# Patient Record
Sex: FEMALE | Race: WHITE | Employment: OTHER | URBAN - METROPOLITAN AREA
[De-identification: names, ages, dates, MRNs, and addresses within clinical notes are randomized per-mention and may not be internally consistent; named-entity substitution may affect disease eponyms.]

---

## 2017-06-02 ENCOUNTER — HOSPITAL ENCOUNTER (EMERGENCY)
Age: 77
Discharge: HOME | End: 2017-06-02
Attending: EMERGENCY MEDICINE
Payer: MEDICARE

## 2017-06-02 VITALS
HEIGHT: 67 IN | WEIGHT: 196 LBS | OXYGEN SATURATION: 95 % | BODY MASS INDEX: 30.76 KG/M2 | TEMPERATURE: 98.6 F | SYSTOLIC BLOOD PRESSURE: 117 MMHG | DIASTOLIC BLOOD PRESSURE: 93 MMHG | HEART RATE: 107 BPM | RESPIRATION RATE: 18 BRPM

## 2017-06-02 DIAGNOSIS — M10.9 ACUTE GOUT OF LEFT ANKLE, UNSPECIFIED CAUSE: Primary | ICD-10-CM

## 2017-06-02 DIAGNOSIS — R32 URINARY INCONTINENCE, UNSPECIFIED TYPE: ICD-10-CM

## 2017-06-02 LAB
ALBUMIN SERPL BCP-MCNC: 3.6 G/DL (ref 3.2–4.6)
ALBUMIN/GLOB SERPL: 0.7 {RATIO} (ref 1.2–3.5)
ALP SERPL-CCNC: 67 U/L (ref 50–136)
ALT SERPL-CCNC: 30 U/L (ref 12–65)
ANION GAP BLD CALC-SCNC: 7 MMOL/L (ref 7–16)
AST SERPL W P-5'-P-CCNC: 97 U/L (ref 15–37)
BACTERIA URNS QL MICRO: ABNORMAL /HPF
BASOPHILS # BLD AUTO: 0 K/UL (ref 0–0.2)
BASOPHILS # BLD: 0 % (ref 0–2)
BILIRUB DIRECT SERPL-MCNC: <0.1 MG/DL
BILIRUB SERPL-MCNC: 0.8 MG/DL (ref 0.2–1.1)
BUN SERPL-MCNC: 29 MG/DL (ref 8–23)
CALCIUM SERPL-MCNC: 9.8 MG/DL (ref 8.3–10.4)
CASTS URNS QL MICRO: 0 /LPF
CHLORIDE SERPL-SCNC: 103 MMOL/L (ref 98–107)
CO2 SERPL-SCNC: 24 MMOL/L (ref 21–32)
CREAT SERPL-MCNC: 1.68 MG/DL (ref 0.6–1)
CRYSTALS URNS QL MICRO: 0 /LPF
DIFFERENTIAL METHOD BLD: ABNORMAL
EOSINOPHIL # BLD: 0.1 K/UL (ref 0–0.8)
EOSINOPHIL NFR BLD: 0 % (ref 0.5–7.8)
EPI CELLS #/AREA URNS HPF: ABNORMAL /HPF
ERYTHROCYTE [DISTWIDTH] IN BLOOD BY AUTOMATED COUNT: 13.2 % (ref 11.9–14.6)
GLOBULIN SER CALC-MCNC: 5 G/DL (ref 2.3–3.5)
GLUCOSE SERPL-MCNC: 129 MG/DL (ref 65–100)
HCT VFR BLD AUTO: 39.3 % (ref 35.8–46.3)
HGB BLD-MCNC: 13.5 G/DL (ref 11.7–15.4)
IMM GRANULOCYTES # BLD: 0 K/UL (ref 0–0.5)
IMM GRANULOCYTES NFR BLD AUTO: 0.3 % (ref 0–5)
LACTATE BLD-SCNC: 1.3 MMOL/L (ref 0.5–1.9)
LYMPHOCYTES # BLD AUTO: 14 % (ref 13–44)
LYMPHOCYTES # BLD: 1.8 K/UL (ref 0.5–4.6)
MCH RBC QN AUTO: 31.2 PG (ref 26.1–32.9)
MCHC RBC AUTO-ENTMCNC: 34.4 G/DL (ref 31.4–35)
MCV RBC AUTO: 90.8 FL (ref 79.6–97.8)
MONOCYTES # BLD: 1.2 K/UL (ref 0.1–1.3)
MONOCYTES NFR BLD AUTO: 10 % (ref 4–12)
MUCOUS THREADS URNS QL MICRO: 0 /LPF
NEUTS SEG # BLD: 9.7 K/UL (ref 1.7–8.2)
NEUTS SEG NFR BLD AUTO: 76 % (ref 43–78)
OTHER OBSERVATIONS,UCOM: ABNORMAL
PLATELET # BLD AUTO: 218 K/UL (ref 150–450)
PMV BLD AUTO: 12.2 FL (ref 10.8–14.1)
POTASSIUM SERPL-SCNC: 3.6 MMOL/L (ref 3.5–5.1)
POTASSIUM SERPL-SCNC: 6 MMOL/L (ref 3.5–5.1)
PROT SERPL-MCNC: 8.6 G/DL (ref 6.3–8.2)
RBC # BLD AUTO: 4.33 M/UL (ref 4.05–5.25)
RBC #/AREA URNS HPF: ABNORMAL /HPF
SODIUM SERPL-SCNC: 134 MMOL/L (ref 136–145)
URATE SERPL-MCNC: 9.6 MG/DL (ref 2.6–6)
WBC # BLD AUTO: 12.8 K/UL (ref 4.3–11.1)
WBC URNS QL MICRO: ABNORMAL /HPF

## 2017-06-02 PROCEDURE — A9270 NON-COVERED ITEM OR SERVICE: HCPCS | Performed by: EMERGENCY MEDICINE

## 2017-06-02 PROCEDURE — 84132 ASSAY OF SERUM POTASSIUM: CPT | Performed by: EMERGENCY MEDICINE

## 2017-06-02 PROCEDURE — 81015 MICROSCOPIC EXAM OF URINE: CPT | Performed by: EMERGENCY MEDICINE

## 2017-06-02 PROCEDURE — 85025 COMPLETE CBC W/AUTO DIFF WBC: CPT | Performed by: EMERGENCY MEDICINE

## 2017-06-02 PROCEDURE — 99285 EMERGENCY DEPT VISIT HI MDM: CPT | Performed by: EMERGENCY MEDICINE

## 2017-06-02 PROCEDURE — 96361 HYDRATE IV INFUSION ADD-ON: CPT | Performed by: EMERGENCY MEDICINE

## 2017-06-02 PROCEDURE — 80048 BASIC METABOLIC PNL TOTAL CA: CPT | Performed by: EMERGENCY MEDICINE

## 2017-06-02 PROCEDURE — 74011250636 HC RX REV CODE- 250/636: Performed by: EMERGENCY MEDICINE

## 2017-06-02 PROCEDURE — 74011636637 HC RX REV CODE- 636/637: Performed by: EMERGENCY MEDICINE

## 2017-06-02 PROCEDURE — 80076 HEPATIC FUNCTION PANEL: CPT | Performed by: EMERGENCY MEDICINE

## 2017-06-02 PROCEDURE — 81003 URINALYSIS AUTO W/O SCOPE: CPT | Performed by: EMERGENCY MEDICINE

## 2017-06-02 PROCEDURE — 84550 ASSAY OF BLOOD/URIC ACID: CPT | Performed by: EMERGENCY MEDICINE

## 2017-06-02 PROCEDURE — 96360 HYDRATION IV INFUSION INIT: CPT | Performed by: EMERGENCY MEDICINE

## 2017-06-02 PROCEDURE — 83605 ASSAY OF LACTIC ACID: CPT

## 2017-06-02 RX ORDER — PREDNISONE 20 MG/1
40 TABLET ORAL DAILY
Qty: 10 TAB | Refills: 0 | Status: SHIPPED | OUTPATIENT
Start: 2017-06-02 | End: 2017-06-07

## 2017-06-02 RX ORDER — PREDNISONE 20 MG/1
40 TABLET ORAL
Status: COMPLETED | OUTPATIENT
Start: 2017-06-02 | End: 2017-06-02

## 2017-06-02 RX ORDER — TRAMADOL HYDROCHLORIDE 50 MG/1
50 TABLET ORAL
Qty: 12 TAB | Refills: 0 | Status: SHIPPED | OUTPATIENT
Start: 2017-06-02 | End: 2017-06-13

## 2017-06-02 RX ADMIN — SODIUM CHLORIDE 500 ML: 900 INJECTION, SOLUTION INTRAVENOUS at 01:53

## 2017-06-02 RX ADMIN — PREDNISONE 40 MG: 20 TABLET ORAL at 03:13

## 2017-06-02 RX ADMIN — SODIUM CHLORIDE 500 ML: 900 INJECTION, SOLUTION INTRAVENOUS at 00:38

## 2017-06-02 NOTE — DISCHARGE INSTRUCTIONS

## 2017-06-02 NOTE — ED PROVIDER NOTES
HPI Comments: 30-year-old white female with recurrent urinary tract infections as well as urinary incontinence and urinary discomfort and swelling in her feet for the past week. She is on a maintenance antibiotic but reportedly still gets infections which require antibiotic changes. She's had no fever or vomiting. No back pain. She does report pain in her feet from the swelling and is having some difficulty walking because of this. Patient is a 77 y.o. female presenting with foot pain. The history is provided by the patient and a relative. Foot Pain    Pertinent negatives include no back pain and no neck pain.         Past Medical History:   Diagnosis Date    Acute on Chronic UTI 9/10/2010    Allergic rhinitis     AMD (age-related macular degeneration), bilateral     ARF (acute renal failure) ([facility] [address] 12/13/2010    Arrhythmia     hx tachycardia     Ataxia     C. difficile colitis 12/17/2010    Calculus of kidney 2/20/2014    Cancer ([facility] [address]     neck skin ca 2 times    Chest pain     Cystocele, midline 2/20/2014    Depression     Falls frequently      last fall 10/11/13    GERD (gastroesophageal reflux disease)     takes OTC    Gross hematuria 2/20/2014    Headache     Hematuria, microscopic     Hypertension     controlled with meds     Hypomagnesemia 12/13/2010    Hypothyroidism     Incomplete bladder emptying 2/20/2014    Incontinence 4/10/2014    Kidney stones     Menopausal syndrome     Microscopic hematuria 2/20/2014    Mixed incontinence urge and stress (male)(female) 2/20/2014    Neurogenic bladder, NOS 2/20/2014    Neuropathy      lower legs    Obese     Osteoarthritis of left knee 3/29/2013    Other chronic cystitis 2/20/2014    Other musculoskeletal symptoms referable to limbs 2/20/2014    Other nonspecific finding on examination of urine 2/20/2014    Pneumonia RLL infiltrate 12/17/2010    Polyneuropathy     Postmenopausal atrophic vaginitis 2/20/2014    Sepsis 9/10/2010    Splitting of urinary stream 2/20/2014    Stroke [facility])     [facility] - Dr. Clara Eckert Total knee replacement status 3/29/2013    Unspecified disorder of bladder 2/20/2014    Unspecified pyelonephritis 9/10/2010    Unspecified urinary incontinence 2/20/2014    S/p Anterior Elevate repair, trans obturator sling    Urge incontinence 2/20/2014    UTI (urinary tract infection) Enterobacter 12/17/2010       Past Surgical History:   Procedure Laterality Date    HX BACK SURGERY      HX COLONOSCOPY      HX HEART CATHETERIZATION  > 5 years ago    2 heart caths - no stents    HX HEENT      cyst removed from nose and gums    HX HYSTERECTOMY      with cyst on ovaries removed    HX KNEE ARTHROSCOPY      Left    HX MOHS PROCEDURES      Right X 2    HX ORTHOPAEDIC      left knee replacement    HX OTHER SURGICAL      rectocele repair    HX OTHER SURGICAL      Elevated cystocele repair    HX OTHER SURGICAL      neck surgury    HX OTHER SURGICAL      [place] Trans Obturator vaginal sling urethropexy ([facility])    HX OTHER SURGICAL      HNP    HX UROLOGICAL      3 bladder tacks    HX UROLOGICAL      2 benign tumors removed from bladder    HX UROLOGICAL      cystoscopy         Family History:   Problem Relation Age of Onset    Heart Disease Father     Stroke Father     Heart Failure Father     Hypertension Father     Cancer Sister     Diabetes Brother     Cancer Sister     Thyroid Disease Sister     Diabetes Brother     Cancer Brother     Hypertension Other      brother, sister    Thyroid Disease Other      brother, sister    Breast Cancer Neg Hx        Social History     Social History    Marital status:      Spouse name: N/A    Number of children: N/A    Years of education: N/A     Occupational History    Not on file.      Social History Main Topics    Smoking status: Never Smoker    Smokeless tobacco: Never Used    Alcohol use No    Drug use: No    Sexual activity: Not on file Other Topics Concern    Not on file     Social History Narrative         ALLERGIES: Adhesive tape-silicones; Clarithromycin; Lortab [hydrocodone-acetaminophen]; Other medication; Peanut; and Prevacid [lansoprazole]    Review of Systems   Constitutional: Negative for fever. HENT: Negative for congestion. Respiratory: Negative for cough and shortness of breath. Cardiovascular: Positive for leg swelling. Negative for chest pain. Gastrointestinal: Negative for abdominal pain, nausea and vomiting. Genitourinary: Positive for dysuria and frequency. Musculoskeletal: Negative for back pain and neck pain. Skin: Negative for rash. Neurological: Negative for headaches. Vitals:    06/02/17 0008   BP: 145/75   Pulse: (!) 107   Resp: 18   Temp: 98.6 °F (37 °C)   SpO2: 95%   Weight: 88.9 kg (196 lb)   Height: 5' 7\" (1.702 m)            Physical Exam   Constitutional: She appears well-developed and well-nourished. No distress. HENT:   Head: Normocephalic and atraumatic. Mouth/Throat: Oropharynx is clear and moist.   Eyes: Conjunctivae are normal. Pupils are equal, round, and reactive to light. Neck: Normal range of motion. Neck supple. Cardiovascular: Normal rate and regular rhythm. No murmur heard. Pulmonary/Chest: Effort normal and breath sounds normal.   Abdominal: Soft. She exhibits no distension. There is no tenderness. Musculoskeletal:   Trace edema in both feet left slightly greater than right. No calf pain. No pretibial edema. Neurological: She is alert. Skin: Skin is warm and dry. Psychiatric: She has a normal mood and affect. Nursing note and vitals reviewed. MDM  Number of Diagnoses or Management Options  Acute gout of left ankle, unspecified cause:   Urinary incontinence, unspecified type:   Diagnosis management comments: CBC is unremarkable except for mild leukocyte esterase is 12.8. Lactic acid is normal at 1.3.  Patient panel shows mild dehydration with a creatinine of 1.68 BUN 29. Initial potassium was elevated but there was hemolysis. It was repeated and found to be normal.  urinalysis shows no infection. Suspect patient's pain and swelling is related to gout. For now we'll treat with prednisone and tramadol for pain. She already has urology follow-up for her urinary incontinence. She is nontoxic and appears safe for discharge home.        Amount and/or Complexity of Data Reviewed  Clinical lab tests: ordered and reviewed  Tests in the medicine section of CPT®: ordered and reviewed    Risk of Complications, Morbidity, and/or Mortality  Presenting problems: moderate  Diagnostic procedures: moderate  Management options: moderate      ED Course       Procedures

## 2017-06-02 NOTE — ED NOTES
Pt to er by ems c/o left foot pain for a week, pt c/o back pain, pt c/o neck pain, pt c/o rt shoulder pain, pt sts she has pain when she urinates, sts she has a \"sharp pain\" when she urinates

## 2017-06-02 NOTE — ED NOTES
I have reviewed discharge instructions with the patient. The patient verbalized understanding. Prescriptions provided x2. Pt discharge via wheelchair with family.

## 2017-06-03 ENCOUNTER — PATIENT OUTREACH (OUTPATIENT)
Dept: CASE MANAGEMENT | Age: 77
End: 2017-06-03

## 2017-06-03 NOTE — PROGRESS NOTES
Transition of Care Discharge Follow-up Questionnaire   Date/Time of Call:   6/3/17 2:35p   What was the patient hospitalized for? Acute gout of left ankle, unspecified cause    Urinary incontinence, unspecified type         Does the patient understand his/her diagnosis and/or treatment and what happened during the hospitalization? Patient understands diagnosis and treatment during hospitalization. Did the patient receive discharge instructions? Yes   Review any discharge instructions (see notes in [facility]). Ask patient if they understand these. Do they have any questions? Patient does not have any questions in regards to instructions. Were home services ordered (nursing, PT, OT, ST, etc.)? No    If so, has the first visit occurred? If not, why? (Assist with coordination of services if necessary.) n/a   Was any DME ordered? No   If so, has it been received? If not, why?  (Assist with coordination of arranging DME orders if necessary.) n/a   Complete a review of all medications (new, continued and discontinued meds per the D/C instructions and medication tab in [facility]). Patient and care coordinator reviewed current medications. Were all new prescriptions filled? If not, why?  (Assist with obtainment of medications if necessary.) Yes   Does the patient understand the purpose and dosing instructions for all medications? (If patient has questions, provide explanation and education.) Yes   Does the patient have any problems in performing ADLs? (If patient is unable to perform ADLs  what is the limiting factor(s)? Do they have a support system that can assist? If no support system is present, discuss possible assistance that they may be able to obtain.) Patient uses a wheelchair for assistance with mobility. She has support with ADLs provided by daughter and son-in-law. Patient's children assist with cooking, cleaning, and bathing.  Patient able to toilet herself and place herself on BSC.    Does the patient have all follow-up appointments scheduled? Has transportation been arranged? [facility] follow-up should be within 7 days of discharge; all others should have PCP follow-up within 7 days of discharge; follow-ups with other specialists as appropriate or ordered.) Patient has an appt scheduled with PCP 6/13/17. Patient was informed of upcoming appts as she did not know date or times. Care coordinator informed patient of information via [facility] chart review. Any other questions or concerns expressed by the patient? No other questions or concerns were expressed by patient to care coordinator. She was thankful for call. SAL Call Completed By: Lyric Hagan LPN  [facility] [address] Coordinator          This note will not be viewable in [address]

## 2018-09-06 ENCOUNTER — HOSPITAL ENCOUNTER (OUTPATIENT)
Dept: INFUSION THERAPY | Age: 78
End: 2018-09-06

## 2019-06-20 ENCOUNTER — HOSPITAL ENCOUNTER (OUTPATIENT)
Dept: CT IMAGING | Age: 79
Discharge: HOME | End: 2019-06-20
Attending: FAMILY MEDICINE

## 2019-06-20 DIAGNOSIS — R41.0 CONFUSION: ICD-10-CM

## 2019-06-20 DIAGNOSIS — Z86.73 HISTORY OF STROKE: ICD-10-CM

## 2019-07-02 ENCOUNTER — APPOINTMENT (OUTPATIENT)
Dept: GENERAL RADIOLOGY | Age: 79
DRG: 470 | End: 2019-07-02
Attending: EMERGENCY MEDICINE
Payer: MEDICARE

## 2019-07-02 ENCOUNTER — HOSPITAL ENCOUNTER (INPATIENT)
Age: 79
LOS: 4 days | Discharge: NURSING, REHAB OR LONG-TERM CARE | DRG: 470 | End: 2019-07-06
Attending: EMERGENCY MEDICINE | Admitting: INTERNAL MEDICINE
Payer: MEDICARE

## 2019-07-02 ENCOUNTER — ANESTHESIA EVENT (OUTPATIENT)
Dept: SURGERY | Age: 79
DRG: 470 | End: 2019-07-02
Payer: MEDICARE

## 2019-07-02 DIAGNOSIS — N31.9 NEUROGENIC BLADDER: ICD-10-CM

## 2019-07-02 DIAGNOSIS — F33.1 MODERATE EPISODE OF RECURRENT MAJOR DEPRESSIVE DISORDER (HCC): ICD-10-CM

## 2019-07-02 DIAGNOSIS — S72.002A CLOSED FRACTURE OF LEFT HIP, INITIAL ENCOUNTER (HCC): Primary | ICD-10-CM

## 2019-07-02 LAB
ANION GAP SERPL CALC-SCNC: 11 MMOL/L (ref 7–16)
ATRIAL RATE: 416 BPM
BASOPHILS # BLD: 0 K/UL (ref 0–0.2)
BASOPHILS NFR BLD: 0 % (ref 0–2)
BUN SERPL-MCNC: 29 MG/DL (ref 8–23)
CALCIUM SERPL-MCNC: 9.7 MG/DL (ref 8.3–10.4)
CALCULATED R AXIS, ECG10: 9 DEGREES
CALCULATED T AXIS, ECG11: 21 DEGREES
CHLORIDE SERPL-SCNC: 107 MMOL/L (ref 98–107)
CO2 SERPL-SCNC: 21 MMOL/L (ref 21–32)
CREAT SERPL-MCNC: 1.2 MG/DL (ref 0.6–1)
DIAGNOSIS, 93000: NORMAL
DIFFERENTIAL METHOD BLD: ABNORMAL
EOSINOPHIL # BLD: 0 K/UL (ref 0–0.8)
EOSINOPHIL NFR BLD: 0 % (ref 0.5–7.8)
ERYTHROCYTE [DISTWIDTH] IN BLOOD BY AUTOMATED COUNT: 13.6 % (ref 11.9–14.6)
GLUCOSE SERPL-MCNC: 117 MG/DL (ref 65–100)
HCT VFR BLD AUTO: 35.6 % (ref 35.8–46.3)
HGB BLD-MCNC: 12 G/DL (ref 11.7–15.4)
IMM GRANULOCYTES # BLD AUTO: 0.1 K/UL (ref 0–0.5)
IMM GRANULOCYTES NFR BLD AUTO: 1 % (ref 0–5)
INR PPP: 1.1
LYMPHOCYTES # BLD: 0.8 K/UL (ref 0.5–4.6)
LYMPHOCYTES NFR BLD: 6 % (ref 13–44)
MCH RBC QN AUTO: 32.8 PG (ref 26.1–32.9)
MCHC RBC AUTO-ENTMCNC: 33.7 G/DL (ref 31.4–35)
MCV RBC AUTO: 97.3 FL (ref 79.6–97.8)
MONOCYTES # BLD: 0.6 K/UL (ref 0.1–1.3)
MONOCYTES NFR BLD: 4 % (ref 4–12)
NEUTS SEG # BLD: 11.3 K/UL (ref 1.7–8.2)
NEUTS SEG NFR BLD: 89 % (ref 43–78)
NRBC # BLD: 0 K/UL (ref 0–0.2)
PLATELET # BLD AUTO: 174 K/UL (ref 150–450)
PMV BLD AUTO: 12.4 FL (ref 9.4–12.3)
POTASSIUM SERPL-SCNC: 4.1 MMOL/L (ref 3.5–5.1)
PROTHROMBIN TIME: 14 SEC (ref 11.7–14.5)
Q-T INTERVAL, ECG07: 314 MS
QRS DURATION, ECG06: 94 MS
QTC CALCULATION (BEZET), ECG08: 402 MS
RBC # BLD AUTO: 3.66 M/UL (ref 4.05–5.2)
SODIUM SERPL-SCNC: 139 MMOL/L (ref 136–145)
VENTRICULAR RATE, ECG03: 99 BPM
WBC # BLD AUTO: 12.8 K/UL (ref 4.3–11.1)

## 2019-07-02 PROCEDURE — 77030020263 HC SOL INJ SOD CL0.9% LFCR 1000ML

## 2019-07-02 PROCEDURE — 80048 BASIC METABOLIC PNL TOTAL CA: CPT

## 2019-07-02 PROCEDURE — 86923 COMPATIBILITY TEST ELECTRIC: CPT

## 2019-07-02 PROCEDURE — 73552 X-RAY EXAM OF FEMUR 2/>: CPT

## 2019-07-02 PROCEDURE — 71045 X-RAY EXAM CHEST 1 VIEW: CPT

## 2019-07-02 PROCEDURE — 86900 BLOOD TYPING SEROLOGIC ABO: CPT

## 2019-07-02 PROCEDURE — 86580 TB INTRADERMAL TEST: CPT | Performed by: INTERNAL MEDICINE

## 2019-07-02 PROCEDURE — 73502 X-RAY EXAM HIP UNI 2-3 VIEWS: CPT

## 2019-07-02 PROCEDURE — 99284 EMERGENCY DEPT VISIT MOD MDM: CPT | Performed by: EMERGENCY MEDICINE

## 2019-07-02 PROCEDURE — 74011250636 HC RX REV CODE- 250/636: Performed by: INTERNAL MEDICINE

## 2019-07-02 PROCEDURE — 77030032490 HC SLV COMPR SCD KNE COVD -B

## 2019-07-02 PROCEDURE — 65270000029 HC RM PRIVATE

## 2019-07-02 PROCEDURE — 87641 MR-STAPH DNA AMP PROBE: CPT

## 2019-07-02 PROCEDURE — 93005 ELECTROCARDIOGRAM TRACING: CPT | Performed by: EMERGENCY MEDICINE

## 2019-07-02 PROCEDURE — 85610 PROTHROMBIN TIME: CPT

## 2019-07-02 PROCEDURE — 74011000302 HC RX REV CODE- 302: Performed by: INTERNAL MEDICINE

## 2019-07-02 PROCEDURE — 36415 COLL VENOUS BLD VENIPUNCTURE: CPT

## 2019-07-02 PROCEDURE — 74011250637 HC RX REV CODE- 250/637: Performed by: INTERNAL MEDICINE

## 2019-07-02 PROCEDURE — 77030005518 HC CATH URETH FOL 2W BARD -B

## 2019-07-02 PROCEDURE — 77030036696 HC BOOT TRACT BUCKS S2SG -A

## 2019-07-02 PROCEDURE — 85025 COMPLETE CBC W/AUTO DIFF WBC: CPT

## 2019-07-02 RX ORDER — VENLAFAXINE HYDROCHLORIDE 37.5 MG/1
37.5 CAPSULE, EXTENDED RELEASE ORAL DAILY
Status: DISCONTINUED | OUTPATIENT
Start: 2019-07-03 | End: 2019-07-06 | Stop reason: HOSPADM

## 2019-07-02 RX ORDER — ACETAMINOPHEN 325 MG/1
650 TABLET ORAL EVERY 8 HOURS
Status: DISCONTINUED | OUTPATIENT
Start: 2019-07-02 | End: 2019-07-03 | Stop reason: HOSPADM

## 2019-07-02 RX ORDER — LISINOPRIL 5 MG/1
10 TABLET ORAL DAILY
Status: DISCONTINUED | OUTPATIENT
Start: 2019-07-03 | End: 2019-07-06 | Stop reason: HOSPADM

## 2019-07-02 RX ORDER — ONDANSETRON 2 MG/ML
4 INJECTION INTRAMUSCULAR; INTRAVENOUS
Status: DISCONTINUED | OUTPATIENT
Start: 2019-07-02 | End: 2019-07-03 | Stop reason: HOSPADM

## 2019-07-02 RX ORDER — TRIMETHOPRIM 100 MG/1
100 TABLET ORAL DAILY
Status: DISCONTINUED | OUTPATIENT
Start: 2019-07-03 | End: 2019-07-03

## 2019-07-02 RX ORDER — SODIUM CHLORIDE 9 MG/ML
100 INJECTION, SOLUTION INTRAVENOUS CONTINUOUS
Status: DISCONTINUED | OUTPATIENT
Start: 2019-07-02 | End: 2019-07-03 | Stop reason: HOSPADM

## 2019-07-02 RX ORDER — ALLOPURINOL 100 MG/1
300 TABLET ORAL DAILY
Status: DISCONTINUED | OUTPATIENT
Start: 2019-07-03 | End: 2019-07-06 | Stop reason: HOSPADM

## 2019-07-02 RX ORDER — ASPIRIN 81 MG/1
81 TABLET ORAL DAILY
Status: DISCONTINUED | OUTPATIENT
Start: 2019-07-03 | End: 2019-07-06 | Stop reason: HOSPADM

## 2019-07-02 RX ORDER — OXYCODONE HYDROCHLORIDE 5 MG/1
5 TABLET ORAL
Status: DISCONTINUED | OUTPATIENT
Start: 2019-07-02 | End: 2019-07-06 | Stop reason: HOSPADM

## 2019-07-02 RX ORDER — SODIUM CHLORIDE 0.9 % (FLUSH) 0.9 %
5-40 SYRINGE (ML) INJECTION EVERY 8 HOURS
Status: DISCONTINUED | OUTPATIENT
Start: 2019-07-02 | End: 2019-07-03 | Stop reason: HOSPADM

## 2019-07-02 RX ORDER — LEVOTHYROXINE SODIUM 75 UG/1
75 TABLET ORAL
Status: DISCONTINUED | OUTPATIENT
Start: 2019-07-03 | End: 2019-07-06 | Stop reason: HOSPADM

## 2019-07-02 RX ORDER — SODIUM CHLORIDE 0.9 % (FLUSH) 0.9 %
5-40 SYRINGE (ML) INJECTION AS NEEDED
Status: DISCONTINUED | OUTPATIENT
Start: 2019-07-02 | End: 2019-07-03 | Stop reason: HOSPADM

## 2019-07-02 RX ADMIN — Medication 10 ML: at 21:29

## 2019-07-02 RX ADMIN — SODIUM CHLORIDE 250 ML: 900 INJECTION, SOLUTION INTRAVENOUS at 18:27

## 2019-07-02 RX ADMIN — ACETAMINOPHEN 650 MG: 325 TABLET, FILM COATED ORAL at 21:29

## 2019-07-02 RX ADMIN — OXYCODONE HYDROCHLORIDE 5 MG: 5 TABLET ORAL at 18:36

## 2019-07-02 RX ADMIN — TUBERCULIN PURIFIED PROTEIN DERIVATIVE 5 UNITS: 5 INJECTION, SOLUTION INTRADERMAL at 18:32

## 2019-07-02 RX ADMIN — Medication 10 ML: at 18:31

## 2019-07-02 NOTE — PROGRESS NOTES
Initial visit to assess  spiritual needs. Pt out of her room, having tests, family members bedside. Left a card with family members. Needs from Spiritual Care: prayer     Ministry of presence & prayer to demonstrate caring & concern, convey emotional & spiritual support.     Chaplain Tiff Bartlett, MDiv,M,PhD

## 2019-07-02 NOTE — PROGRESS NOTES
TRANSFER - IN REPORT:    Verbal report received from Alejandra Guillen RN(name) on Amado Cotton  being received from ED(unit) for routine progression of care      Report consisted of patients Situation, Background, Assessment and   Recommendations(SBAR). Information from the following report(s) SBAR, Intake/Output, MAR and Recent Results was reviewed with the receiving nurse. Opportunity for questions and clarification was provided. Assessment to be completed upon patients arrival to unit and care assumed.

## 2019-07-02 NOTE — ED TRIAGE NOTES
Pt to ed with c/o fall on left side - pt reports left hip pain - no shortening or rotation per ems - vitals stable - pt alert and oriented x3

## 2019-07-02 NOTE — ED PROVIDER NOTES
Patient is a 21-year-old female who presents with left hip pain. Patient states that she fell yesterday while cooking breakfast and was in significant pain however was able to get up and actually continued cooking breakfast and then remained in pain yesterday with some ambulation however pain worsened today so called ambulance. She denies any dizziness or weakness and states rather she stumbled while \"trying to move around fast\" to get breakfast done. She did hit her head slightly however no LOC, not on blood thinner. No neck or back pain, no chest pain or SOB, no abdominal pain, no further complaints.            Past Medical History:   Diagnosis Date    Acute on Chronic UTI 9/10/2010    Allergic rhinitis     AMD (age-related macular degeneration), bilateral     ARF (acute renal failure) ([address] 12/13/2010    Arrhythmia     hx tachycardia     Ataxia     C. difficile colitis 12/17/2010    Calculus of kidney 2/20/2014    Cancer ([facility] [address]     neck skin ca 2 times    Chest pain     Cystocele, midline 2/20/2014    Depression     Falls frequently      last fall 10/11/13    GERD (gastroesophageal reflux disease)     takes OTC    Gross hematuria 2/20/2014    Headache     Hematuria, microscopic     Hypertension     controlled with meds     Hypomagnesemia 12/13/2010    Hypothyroidism     Incomplete bladder emptying 2/20/2014    Incontinence 4/10/2014    Kidney stones     Menopausal syndrome     Microscopic hematuria 2/20/2014    Mixed incontinence urge and stress (male)(female) 2/20/2014    Neurogenic bladder, NOS 2/20/2014    Neuropathy      lower legs    Obese     Osteoarthritis of left knee 3/29/2013    Other chronic cystitis 2/20/2014    Other musculoskeletal symptoms referable to limbs(729.89) 2/20/2014    Other nonspecific finding on examination of urine 2/20/2014    Pneumonia RLL infiltrate 12/17/2010    Polyneuropathy     Postmenopausal atrophic vaginitis 2/20/2014    Sepsis 9/10/2010  Splitting of urinary stream 2/20/2014    Stroke [facility])     [facility] - Dr. Maciej Gilmore Total knee replacement status 3/29/2013    Unspecified disorder of bladder 2/20/2014    Unspecified pyelonephritis 9/10/2010    Unspecified urinary incontinence 2/20/2014    S/p Anterior Elevate repair, trans obturator sling    Urge incontinence 2/20/2014    UTI (urinary tract infection) Enterobacter 12/17/2010       Past Surgical History:   Procedure Laterality Date    HX BACK SURGERY      HX COLONOSCOPY      HX HEART CATHETERIZATION  > 5 years ago    2 heart caths - no stents    HX HEENT      cyst removed from nose and gums    HX HYSTERECTOMY      with cyst on ovaries removed    HX KNEE ARTHROSCOPY      Left    HX MOHS PROCEDURES      Right X 2    HX ORTHOPAEDIC      left knee replacement    HX OTHER SURGICAL      rectocele repair    HX OTHER SURGICAL      Elevated cystocele repair    HX OTHER SURGICAL      neck surgury    HX OTHER SURGICAL      [place] Trans Obturator vaginal sling urethropexy ([facility])    HX OTHER SURGICAL      HNP    HX UROLOGICAL      3 bladder tacks    HX UROLOGICAL      2 benign tumors removed from bladder    HX UROLOGICAL      cystoscopy         Family History:   Problem Relation Age of Onset    Heart Disease Father     Stroke Father     Heart Failure Father     Hypertension Father     Cancer Sister     Diabetes Brother     Cancer Sister     Thyroid Disease Sister     Diabetes Brother     Cancer Brother     Hypertension Other         brother, sister    Thyroid Disease Other         brother, sister    Breast Cancer Neg Hx        Social History     Socioeconomic History    Marital status:      Spouse name: Not on file    Number of children: Not on file    Years of education: Not on file    Highest education level: Not on file   Occupational History    Not on file   Social Needs    Financial resource strain: Not on file    Food insecurity:     Worry: Not on file Inability: Not on file    Transportation needs:     Medical: Not on file     Non-medical: Not on file   Tobacco Use    Smoking status: Never Smoker    Smokeless tobacco: Never Used   Substance and Sexual Activity    Alcohol use: No    Drug use: No    Sexual activity: Not on file   Lifestyle    Physical activity:     Days per week: Not on file     Minutes per session: Not on file    Stress: Not on file   Relationships    Social connections:     Talks on phone: Not on file     Gets together: Not on file     Attends Christianity service: Not on file     Active member of club or organization: Not on file     Attends meetings of clubs or organizations: Not on file     Relationship status: Not on file    Intimate partner violence:     Fear of current or ex partner: Not on file     Emotionally abused: Not on file     Physically abused: Not on file     Forced sexual activity: Not on file   Other Topics Concern    Not on file   Social History Narrative    Not on file         ALLERGIES: Adhesive tape-silicones; Clarithromycin; Lortab [hydrocodone-acetaminophen]; Other medication; Peanut; Prednisone; and Prevacid [lansoprazole]    Review of Systems   Constitutional: Negative for chills and fever. HENT: Negative for rhinorrhea and sore throat. Eyes: Negative for visual disturbance. Respiratory: Negative for cough and shortness of breath. Cardiovascular: Negative for chest pain and leg swelling. Gastrointestinal: Negative for abdominal pain, diarrhea, nausea and vomiting. Genitourinary: Negative for dysuria. Musculoskeletal: Positive for arthralgias. Negative for back pain and neck pain. Skin: Negative for rash. Neurological: Negative for weakness and headaches. Psychiatric/Behavioral: The patient is not nervous/anxious. There were no vitals filed for this visit. Physical Exam   Constitutional: She is oriented to person, place, and time.  She appears well-developed and well-nourished. HENT:   Head: Normocephalic and atraumatic. Right Ear: External ear normal.   Left Ear: External ear normal.   Eyes: Pupils are equal, round, and reactive to light. Conjunctivae and EOM are normal.   Neck: Normal range of motion. Neck supple. No tracheal deviation present. Cardiovascular: Normal rate, regular rhythm, normal heart sounds and intact distal pulses. No murmur heard. Pulmonary/Chest: Effort normal and breath sounds normal. No respiratory distress. Abdominal: Soft. There is no tenderness. Musculoskeletal: Normal range of motion. She exhibits tenderness (to palpation of left hip, DP pulse intact, ROM limited by pain. ). No C, T or L spine tenderness   Neurological: She is alert and oriented to person, place, and time. No cranial nerve deficit. Cn 2-12 fully intact, strength and sensation 5/5 in all extremities with exception of LLE due to pain from broken hip. no focal deficits appreciated. Skin: No rash noted. Nursing note and vitals reviewed. MDM  Number of Diagnoses or Management Options  Closed fracture of left hip, initial encounter [facility]): new and requires workup     Amount and/or Complexity of Data Reviewed  Clinical lab tests: ordered and reviewed  Tests in the radiology section of CPT®: ordered and reviewed  Tests in the medicine section of CPT®: ordered and reviewed  Review and summarize past medical records: yes    Risk of Complications, Morbidity, and/or Mortality  Presenting problems: high  Diagnostic procedures: high  Management options: high    Patient Progress  Patient progress: stable         Procedures      Xr Chest Sngl V    Result Date: 7/2/2019  History: Fall yesterday. Left hip pain. LEFT HIP SERIES: There is a mildly angulated fracture at the junction of the left femoral head and neck. Osteoarthritis is present in both hips. Lower lumbar degenerative spondylosis is present.  LEFT FEMUR AP AND LATERAL VIEWS: There is a fracture at the junction of the femoral head and neck. A left knee prosthesis is present. PORTABLE CHEST X-RAY: Comparison is made to previous study October 10, 2011. There is no consolidation, pleural effusions or visible pneumothorax. IMPRESSION: Subcapital left hip fracture. Xr Hip Lt W Or Wo Pelv 2-3 Vws    Result Date: 7/2/2019  History: Fall yesterday. Left hip pain. LEFT HIP SERIES: There is a mildly angulated fracture at the junction of the left femoral head and neck. Osteoarthritis is present in both hips. Lower lumbar degenerative spondylosis is present. LEFT FEMUR AP AND LATERAL VIEWS: There is a fracture at the junction of the femoral head and neck. A left knee prosthesis is present. PORTABLE CHEST X-RAY: Comparison is made to previous study October 10, 2011. There is no consolidation, pleural effusions or visible pneumothorax. IMPRESSION: Subcapital left hip fracture. Xr Femur Lt 2 V    Result Date: 7/2/2019  History: Fall yesterday. Left hip pain. LEFT HIP SERIES: There is a mildly angulated fracture at the junction of the left femoral head and neck. Osteoarthritis is present in both hips. Lower lumbar degenerative spondylosis is present. LEFT FEMUR AP AND LATERAL VIEWS: There is a fracture at the junction of the femoral head and neck. A left knee prosthesis is present. PORTABLE CHEST X-RAY: Comparison is made to previous study October 10, 2011. There is no consolidation, pleural effusions or visible pneumothorax. IMPRESSION: Subcapital left hip fracture. Ct Head Wo Cont    Result Date: 6/20/2019  CT HEAD WITHOUT CONTRAST. INDICATION: Worsening confusion and prior CVA COMPARISON: November 2014 and December 2010. TECHNIQUE:   5 mm axial scans from the skull base to the vertex. Our CT scanners use one or more of the following:  Automated exposure control, adjustment of the mA and or kV according to patient size, iterative reconstruction.  FINDINGS:  No acute intraparenchymal hemorrhage or abnormal extra-axial fluid collection. The ventricles are prominent although there is bilateral volume loss. .  No midline shift or mass effect. Included portion of the paranasal sinuses and orbits grossly unremarkable. IMPRESSION:  Negative for acute intracranial abnormality. Chronic changes. 68 yo female with left hip fracture:    Consult to orthopedics, discussed with hospitalist for admission, will review basic labs and EKG prior to admission.

## 2019-07-02 NOTE — CONSULTS
FRACTURE CONSULT NOTE    Subjective:     Date of Consultation:  July 2, 2019  Referring Physician:  Mg Fairchild is a 69 y.o. female who is being seen for left hip pain. Injury occurred Patient fell at home today Patient's ambulatory status includes Uses cane at home and their home environment is home. Patient is a 20-year-old female who presents with left hip pain. Patient states that she fell yesterday while cooking breakfast and was in significant pain however was able to get up and actually continued cooking breakfast and then remained in pain yesterday with some ambulation however pain worsened today so called ambulance. She denies any dizziness or weakness and states rather she stumbled while \"trying to move around fast\" to get breakfast done. She did hit her head slightly however no LOC, not on blood thinner. No neck or back pain, no chest pain or SOB, no abdominal pain, no further complaints.           Patient Active Problem List    Diagnosis Date Noted    Closed left hip fracture ([facility] [address] 07/02/2019    Recurrent UTI 04/25/2016    Depression     Polyneuropathy     Allergic rhinitis     AMD (age-related macular degeneration), bilateral     Stroke ([address]     Arrhythmia     GERD (gastroesophageal reflux disease)     Cancer ([facility] [address]     Incontinence 04/10/2014    Cystocele, midline 02/20/2014    Mixed incontinence urge and stress (male)(female) 02/20/2014    Microscopic hematuria 02/20/2014    Neurogenic bladder 02/20/2014    Urge incontinence 02/20/2014    Postmenopausal atrophic vaginitis 02/20/2014    Calculus of kidney 02/20/2014    Osteoarthritis of left knee 03/29/2013    HTN (hypertension) 09/10/2010    Hypothyroidism 09/10/2010     Family History   Problem Relation Age of Onset    Heart Disease Father     Stroke Father     Heart Failure Father     Hypertension Father     Cancer Sister     Diabetes Brother     Cancer Sister     Thyroid Disease Sister    [facility] Diabetes Brother     Cancer Brother     Hypertension Other         brother, sister    Thyroid Disease Other         brother, sister    Breast Cancer Neg Hx       Social History     Tobacco Use    Smoking status: Never Smoker    Smokeless tobacco: Never Used   Substance Use Topics    Alcohol use: No     Past Medical History:   Diagnosis Date    Acute on Chronic UTI 9/10/2010    Allergic rhinitis     AMD (age-related macular degeneration), bilateral     ARF (acute renal failure) ([facility] [address] 12/13/2010    Arrhythmia     hx tachycardia     Ataxia     C. 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 times    Chest pain     Cystocele, midline 2/20/2014    Depression     Falls frequently      last fall 10/11/13    GERD (gastroesophageal reflux disease)     takes OTC    Gross hematuria 2/20/2014    Headache     Hematuria, microscopic     Hypertension     controlled with meds     Hypomagnesemia 12/13/2010    Hypothyroidism     Incomplete bladder emptying 2/20/2014    Incontinence 4/10/2014    Kidney stones     Menopausal syndrome     Microscopic hematuria 2/20/2014    Mixed incontinence urge and stress (male)(female) 2/20/2014    Neurogenic bladder, NOS 2/20/2014    Neuropathy      lower legs    Obese     Osteoarthritis of left knee 3/29/2013    Other chronic cystitis 2/20/2014    Other musculoskeletal symptoms referable to limbs(729.89) 2/20/2014    Other nonspecific finding on examination of urine 2/20/2014    Pneumonia RLL infiltrate 12/17/2010    Polyneuropathy     Postmenopausal atrophic vaginitis 2/20/2014    Sepsis 9/10/2010    Splitting of urinary stream 2/20/2014    Stroke [facility])     [facility] - Dr. Nydia Todd Total knee replacement status 3/29/2013    Unspecified disorder of bladder 2/20/2014    Unspecified pyelonephritis 9/10/2010    Unspecified urinary incontinence 2/20/2014    S/p Anterior Elevate repair, trans obturator sling    Urge incontinence 2/20/2014    UTI (urinary tract infection) Enterobacter 12/17/2010      Past Surgical History:   Procedure Laterality Date    HX BACK SURGERY      HX COLONOSCOPY      HX HEART CATHETERIZATION  > 5 years ago    2 heart caths - no stents    HX HEENT      cyst removed from nose and gums    HX HYSTERECTOMY      with cyst on ovaries removed    HX KNEE ARTHROSCOPY      Left    HX MOHS PROCEDURES      Right X 2    HX ORTHOPAEDIC      left knee replacement    HX OTHER SURGICAL      rectocele repair    HX OTHER SURGICAL      Elevated cystocele repair    HX OTHER SURGICAL      neck surgury    HX OTHER SURGICAL      [place] Trans Obturator vaginal sling urethropexy ([facility])    HX OTHER SURGICAL      HNP    HX UROLOGICAL      3 bladder tacks    HX UROLOGICAL      2 benign tumors removed from bladder    HX UROLOGICAL      cystoscopy      Prior to Admission medications    Medication Sig Start Date End Date Taking? Authorizing Provider   venlafaxine-SR [facility] 37.5 mg capsule Take 1 Cap by mouth daily. 6/17/19  Yes Shima Richard MD   allopurinol (ZYLOPRIM) 300 mg tablet Take 1 Tab by mouth daily. 6/5/19  Yes Shima Richard MD   lisinopril (PRINIVIL, ZESTRIL) 10 mg tablet TAKE ONE (1) TABLET BY MOUTH DAILY. 6/5/19  Yes Shima Richard MD   levothyroxine (SYNTHROID) 75 mcg tablet Take 1 Tab by mouth Daily (before breakfast). 6/5/19  Yes Shima Richard MD   trimethoprim (TRIMPEX) 100 mg tablet Take 1 Tab by mouth two (2) times a day. 9/17/18  Yes Roberto Carlos Steele NP   oxybutynin chloride XL (DITROPAN XL) 10 mg CR tablet TAKE ONE (1) TABLET BY MOUTH DAILY. 9/12/18  Yes Roberto Carlos Steele NP   doxycycline (MONODOX) 100 mg capsule Take 1 Cap by mouth daily. 6/18/18  Yes Roberto Carlos Steele NP   MYRBETRIQ 50 mg ER tablet Take 1 Tab by mouth daily. Indications: URINARY URGE INCONTINENCE 6/18/18  Yes Roberto Carlos Steele NP   aspirin delayed-release 81 mg tablet Take 81 mg by mouth daily.    Yes Provider, Historical   acetaminophen (TYLENOL) 650 mg CR tablet Take 500 mg by mouth every six (6) hours as needed for Pain. Provider, Historical     Current Facility-Administered Medications   Medication Dose Route Frequency    [START ON 7/3/2019] levothyroxine (SYNTHROID) tablet 75 mcg  75 mcg Oral ACB    [START ON 7/3/2019] allopurinol (ZYLOPRIM) tablet 300 mg  300 mg Oral DAILY    [START ON 7/3/2019] aspirin delayed-release tablet 81 mg  81 mg Oral DAILY    [START ON 7/3/2019] venlafaxine-SR (EFFEXOR-XR) capsule 37.5 mg  37.5 mg Oral DAILY    [START ON 7/3/2019] lisinopril (PRINIVIL, ZESTRIL) tablet 10 mg  10 mg Oral DAILY    0.9% sodium chloride infusion  100 mL/hr IntraVENous CONTINUOUS    sodium chloride 0.9 % bolus infusion 250 mL  250 mL IntraVENous CONTINUOUS    sodium chloride (NS) flush 5-40 mL  5-40 mL IntraVENous Q8H    sodium chloride (NS) flush 5-40 mL  5-40 mL IntraVENous PRN    acetaminophen (TYLENOL) tablet 650 mg  650 mg Oral Q8H    tuberculin injection 5 Units  5 Units IntraDERMal ONCE    ondansetron (ZOFRAN) injection 4 mg  4 mg IntraVENous Q6H PRN    oxyCODONE IR (ROXICODONE) tablet 5 mg  5 mg Oral Q6H PRN    [START ON 7/3/2019] trimethoprim (TRIMPEX) tablet 100 mg  100 mg Oral DAILY      Allergies   Allergen Reactions    Adhesive Tape-Silicones Rash    Clarithromycin Rash    Lortab [Hydrocodone-Acetaminophen] Nausea and Vomiting    Other Medication Nausea and Vomiting     Antibiotic for abd infection- Prev-pack given for H. Pylori    Peanut Itching    Prednisone Nausea and Vomiting     Medrol dose pack    Prevacid [Lansoprazole] Nausea and Vomiting        Review of Systems:  Pertinent items are noted in HPI. previous total knee replacementPatient is a 50-year-old female who presents with left hip pain.   Patient states that she fell yesterday while cooking breakfast and was in significant pain however was able to get up and actually continued cooking breakfast and then remained in pain yesterday with some ambulation however pain worsened today so called ambulance. She denies any dizziness or weakness and states rather she stumbled while \"trying to move around fast\" to get breakfast done. She did hit her head slightly however no LOC, not on blood thinner. No neck or back pain, no chest pain or SOB, no abdominal pain, no further complaints. Mental Status: mild dementia    Objective:     Patient Vitals for the past 8 hrs:   BP Temp Pulse Resp SpO2   19 1728 168/84 99 °F (37.2 °C) (!) 106 18 93 %   19 1701 163/78  (!) 111 18 97 %   19 1500 174/79    98 %     Temp (24hrs), Av °F (37.2 °C), Min:99 °F (37.2 °C), Max:99 °F (37.2 °C)    Patient is a 67-year-old female who presents with left hip pain. Patient states that she fell yesterday while cooking breakfast and was in significant pain however was able to get up and actually continued cooking breakfast and then remained in pain yesterday with some ambulation however pain worsened today so called ambulance. She denies any dizziness or weakness and states rather she stumbled while \"trying to move around fast\" to get breakfast done. She did hit her head slightly however no LOC, not on blood thinner. No neck or back pain, no chest pain or SOB, no abdominal pain, no further complaints. Physical Exam   Constitutional: She is oriented to person, place, and time. She appears well-developed and well-nourished. HENT:   Head: Normocephalic and atraumatic. Right Ear: External ear normal.   Left Ear: External ear normal.   Eyes: Pupils are equal, round, and reactive to light. Conjunctivae and EOM are normal.   Neck: Normal range of motion. Neck supple. No tracheal deviation present. Cardiovascular: Normal rate, regular rhythm, normal heart sounds and intact distal pulses. No murmur heard. Pulmonary/Chest: Effort normal and breath sounds normal. No respiratory distress. Abdominal: Soft.  There is no tenderness. Hip  Painful with healed knee incision         Imaging Review:  Results   XR HIP LT W OR WO PELV 2-3 VWS (Accession [id number]) (Order [id number])   Allergies      Not Specified: Adhesive Tape-silicones;  Clarithromycin; Lortab [Hydrocodone-acetaminophen]; Other Medication;  Peanut;  Prednisone;  Prevacid [Lansoprazole]   Result Information     Status: Final result (Exam End: 7/2/2019 14:55) Provider Status: Open   Signed by     Signed Date/Time  Phone Pager   Alisa Baldwin 7/02/2019 15:02 [phone]    Exam Information     Status Exam Begun  Exam Ended    Final [99] 7/02/2019 14:27 7/02/2019 14:55   Study Result     History: Fall yesterday. Left hip pain.     LEFT HIP SERIES: There is a mildly angulated fracture at the junction of the  left femoral head and neck. Osteoarthritis is present in both hips. Lower lumbar  degenerative spondylosis is present.     LEFT FEMUR AP AND LATERAL VIEWS: There is a fracture at the junction of the  femoral head and neck. A left knee prosthesis is present.     PORTABLE CHEST X-RAY: Comparison is made to previous study October 10, 2011. There is no consolidation, pleural effusions or visible pneumothorax.        IMPRESSION  IMPRESSION: Subcapital left hip fracture.    Imaging     XR HIP LT W OR WO PELV 2-3 VWS (Order: [id number]) - 7/2/2019   Result History     XR HIP LT W OR WO PELV 2-3 VWS (Order #[id number]) on 7/2/2019 - Order Result History Report   PACS Images      Show images for XR HIP LT W OR WO PELV 2-3 VWS   Patient Images      Show images for Arlana Dials          External Results Report   Order Report     Order Details       Labs:   Recent Results (from the past 24 hour(s))   CBC WITH AUTOMATED DIFF    Collection Time: 07/02/19  3:24 PM   Result Value Ref Range    WBC 12.8 (H) 4.3 - 11.1 K/uL    RBC 3.66 (L) 4.05 - 5.2 M/uL    HGB 12.0 11.7 - 15.4 g/dL    HCT 35.6 (L) 35.8 - 46.3 %    MCV 97.3 79.6 - 97.8 FL    MCH 32.8 26.1 - 32.9 PG MCHC 33.7 31.4 - 35.0 g/dL    RDW 13.6 11.9 - 14.6 %    PLATELET 658 158 - 347 K/uL    MPV 12.4 (H) 9.4 - 12.3 FL    ABSOLUTE NRBC 0.00 0.0 - 0.2 K/uL    DF AUTOMATED      NEUTROPHILS 89 (H) 43 - 78 %    LYMPHOCYTES 6 (L) 13 - 44 %    MONOCYTES 4 4.0 - 12.0 %    EOSINOPHILS 0 (L) 0.5 - 7.8 %    BASOPHILS 0 0.0 - 2.0 %    IMMATURE GRANULOCYTES 1 0.0 - 5.0 %    ABS. NEUTROPHILS 11.3 (H) 1.7 - 8.2 K/UL    ABS. LYMPHOCYTES 0.8 0.5 - 4.6 K/UL    ABS. MONOCYTES 0.6 0.1 - 1.3 K/UL    ABS. EOSINOPHILS 0.0 0.0 - 0.8 K/UL    ABS. BASOPHILS 0.0 0.0 - 0.2 K/UL    ABS. IMM.  GRANS. 0.1 0.0 - 0.5 K/UL   METABOLIC PANEL, BASIC    Collection Time: 07/02/19  3:24 PM   Result Value Ref Range    Sodium 139 136 - 145 mmol/L    Potassium 4.1 3.5 - 5.1 mmol/L    Chloride 107 98 - 107 mmol/L    CO2 21 21 - 32 mmol/L    Anion gap 11 7 - 16 mmol/L    Glucose 117 (H) 65 - 100 mg/dL    BUN 29 (H) 8 - 23 MG/DL    Creatinine 1.20 (H) 0.6 - 1.0 MG/DL    GFR est AA 56 (L) >60 ml/min/1.73m2    GFR est non-AA 46 (L) >60 ml/min/1.73m2    Calcium 9.7 8.3 - 10.4 MG/DL   PROTHROMBIN TIME + INR    Collection Time: 07/02/19  3:24 PM   Result Value Ref Range    Prothrombin time 14.0 11.7 - 14.5 sec    INR 1.1     EKG, 12 LEAD, INITIAL    Collection Time: 07/02/19  3:36 PM   Result Value Ref Range    Ventricular Rate 99 BPM    Atrial Rate 416 BPM    QRS Duration 94 ms    Q-T Interval 314 ms    QTC Calculation (Bezet) 402 ms    Calculated R Axis 9 degrees    Calculated T Axis 21 degrees    Diagnosis       !! AGE AND GENDER SPECIFIC ECG ANALYSIS !!  nsr with artifact  Nonspecific ST abnormality  Abnormal ECG  When compared with ECG of 05-NOV-2014 16:26,  Confirmed by Prabhjot An MD (), KEY COLEMAN ([id number]) on 7/2/2019 5:13:29 PM           Impression:     Principal Problem:    Closed left hip fracture ([address] (7/2/2019)    Active Problems:    HTN (hypertension) (9/10/2010)      Hypothyroidism (9/10/2010)      Osteoarthritis of left knee (3/29/2013) Neurogenic bladder (2/20/2014)      Incontinence (4/10/2014)      Depression ()      Recurrent UTI (4/25/2016)        Plan:   Bipolar hip arthroplasty  Procedure explained in detail to patient and family      Tiffani Rothman DO

## 2019-07-02 NOTE — H&P
Hospitalist Note     Admit Date:  2019  2:16 PM   Name:  Shun Mckeon   Age:  69 y.o.  :  1940   MRN:  [id number]   PCP:  Ed Martins MD  Treatment Team: Attending Provider: Jann Hernandez MD; Primary Nurse: Armando Urbina RN    HPI/Subjective:   Mrs. Hubert Richardson is a nice 70 y/o WF with a h/o frequent UTIs on daily antibiotic suppression (TMP and doxycycline), hypothyroidism, CVA, HTN, MDD presenting s/p fall yesterday at home. She was in the kitchen cooking and was trying to \"move too quickly\" when she lost her balance and fell onto her left side. She noticed pain of the left hip but managed somewhat ok throughout yesterday, but pain persisted so she came to the ED today. Imaging shows left subcapital hip fracture. She denies chest pain, SOB, palpitations, dizziness or syncope at any point during her fall yesterday. Family is present and note that she has had frequent falls over the past several months, but has not had a fall in a few weeks. They thought it was neuropathy. She also recently saw Urology for another UTI, culture grew klebsiella pna sensitive to TMP (but resistant to doxy) which is what she was prescribed (in addition to daily doxycycline). Hospitalist consulted for admission. 10 systems reviewed and negative except as noted in HPI.   Past Medical History:   Diagnosis Date    Acute on Chronic UTI 9/10/2010    Allergic rhinitis     AMD (age-related macular degeneration), bilateral     ARF (acute renal failure) ([address] 2010    Arrhythmia     hx tachycardia     Ataxia     C. difficile colitis 2010    Calculus of kidney 2014    Cancer [facility] [address]     neck skin ca 2 times    Chest pain     Cystocele, midline 2014    Depression     Falls frequently      last fall 10/11/13    GERD (gastroesophageal reflux disease)     takes OTC    Gross hematuria 2014    Headache     Hematuria, microscopic     Hypertension     controlled with meds  Hypomagnesemia 12/13/2010    Hypothyroidism     Incomplete bladder emptying 2/20/2014    Incontinence 4/10/2014    Kidney stones     Menopausal syndrome     Microscopic hematuria 2/20/2014    Mixed incontinence urge and stress (male)(female) 2/20/2014    Neurogenic bladder, NOS 2/20/2014    Neuropathy      lower legs    Obese     Osteoarthritis of left knee 3/29/2013    Other chronic cystitis 2/20/2014    Other musculoskeletal symptoms referable to limbs(729.89) 2/20/2014    Other nonspecific finding on examination of urine 2/20/2014    Pneumonia RLL infiltrate 12/17/2010    Polyneuropathy     Postmenopausal atrophic vaginitis 2/20/2014    Sepsis 9/10/2010    Splitting of urinary stream 2/20/2014    Stroke [facility])     [facility] - Dr. Yuliana Ceballos Total knee replacement status 3/29/2013    Unspecified disorder of bladder 2/20/2014    Unspecified pyelonephritis 9/10/2010    Unspecified urinary incontinence 2/20/2014    S/p Anterior Elevate repair, trans obturator sling    Urge incontinence 2/20/2014    UTI (urinary tract infection) Enterobacter 12/17/2010      Past Surgical History:   Procedure Laterality Date    HX BACK SURGERY      HX COLONOSCOPY      HX HEART CATHETERIZATION  > 5 years ago    2 heart caths - no stents    HX HEENT      cyst removed from nose and gums    HX HYSTERECTOMY      with cyst on ovaries removed    HX KNEE ARTHROSCOPY      Left    HX MOHS PROCEDURES      Right X 2    HX ORTHOPAEDIC      left knee replacement    HX OTHER SURGICAL      rectocele repair    HX OTHER SURGICAL      Elevated cystocele repair    HX OTHER SURGICAL      neck surgury    HX OTHER SURGICAL      Surprise Trans Obturator vaginal sling urethropexy ([facility])    HX OTHER SURGICAL      HNP    HX UROLOGICAL      3 bladder tacks    HX UROLOGICAL      2 benign tumors removed from bladder    HX UROLOGICAL      cystoscopy      Allergies   Allergen Reactions    Adhesive Tape-Silicones Rash    Clarithromycin Rash    Lortab [Hydrocodone-Acetaminophen] Nausea and Vomiting    Other Medication Nausea and Vomiting     Antibiotic for abd infection- Prev-pack given for H. Pylori    Peanut Itching    Prednisone Nausea and Vomiting     Medrol dose pack    Prevacid [Lansoprazole] Nausea and Vomiting      Social History     Tobacco Use    Smoking status: Never Smoker    Smokeless tobacco: Never Used   Substance Use Topics    Alcohol use: No      Family History   Problem Relation Age of Onset    Heart Disease Father     Stroke Father     Heart Failure Father     Hypertension Father     Cancer Sister     Diabetes Brother     Cancer Sister     Thyroid Disease Sister     Diabetes Brother     Cancer Brother     Hypertension Other         brother, sister    Thyroid Disease Other         brother, sister    Breast Cancer Neg Hx       Immunization History   Administered Date(s) Administered    Influenza Vaccine 10/21/2014    Influenza Vaccine (Quad) PF 11/15/2018    Influenza Vaccine PF 10/15/2015    Pneumococcal Conjugate (PCV-13) 12/16/2016    Pneumococcal Vaccine (Unspecified Type) 03/16/2012    TB Skin Test (PPD) Intradermal 03/30/2013     PTA Medications:  Prior to Admission Medications   Prescriptions Last Dose Informant Patient Reported? Taking? MYRBETRIQ 50 mg ER tablet   No No   Sig: Take 1 Tab by mouth daily. Indications: URINARY URGE INCONTINENCE   acetaminophen (TYLENOL) 650 mg CR tablet   Yes No   Sig: Take 500 mg by mouth every six (6) hours as needed for Pain. allopurinol (ZYLOPRIM) 300 mg tablet   No No   Sig: Take 1 Tab by mouth daily. aspirin delayed-release 81 mg tablet   Yes No   Sig: Take  by mouth daily. doxycycline (MONODOX) 100 mg capsule   No No   Sig: Take 1 Cap by mouth daily. levothyroxine (SYNTHROID) 75 mcg tablet   No No   Sig: Take 1 Tab by mouth Daily (before breakfast).    lisinopril (PRINIVIL, ZESTRIL) 10 mg tablet   No No   Sig: TAKE ONE (1) TABLET BY MOUTH DAILY. oxybutynin chloride XL (DITROPAN XL) 10 mg CR tablet   No No   Sig: TAKE ONE (1) TABLET BY MOUTH DAILY. trimethoprim (TRIMPEX) 100 mg tablet   No No   Sig: Take 1 Tab by mouth two (2) times a day. venlafaxine-SR (EFFEXOR-XR) 37.5 mg capsule   No No   Sig: Take 1 Cap by mouth daily. Facility-Administered Medications: None       Objective:     Patient Vitals for the past 24 hrs:   BP SpO2   07/02/19 1500 174/79 98 %     Oxygen Therapy  O2 Sat (%): 98 % (07/02/19 1500)  Pulse via Oximetry: 112 beats per minute (07/02/19 1500)    No intake or output data in the 24 hours ending 07/02/19 1545    *Note that automatically entered I/Os may not be accurate; dependent on patient compliance with collection and accurate  by assistants. Physical Exam:  General:    Well nourished. Alert. A/O x3. NAD. Eyes:   Normal sclerae. Extraocular movements intact. HENT:  Normocephalic, atraumatic. Moist mucous membranes  CV:   RRR. No m/r/g. Lungs:  CTAB. No wheezing, rhonchi, or rales. Abdomen: Soft, nontender, nondistended. Extremities: Warm and dry. No cyanosis or edema. Left hip and proximal femur are tender to palpation; no abnormal rotation of the hip; no rash or bruising to the area. Right hip is normal, non-tender. Neurologic: CN II-XII grossly intact. Sensation intact. Skin:     No rashes or jaundice. Normal coloration  Psych:  Normal mood and affect. I reviewed the labs, imaging, EKGs, telemetry, and other studies done this admission.   Data Review:   Recent Results (from the past 24 hour(s))   CBC WITH AUTOMATED DIFF    Collection Time: 07/02/19  3:24 PM   Result Value Ref Range    WBC 12.8 (H) 4.3 - 11.1 K/uL    RBC 3.66 (L) 4.05 - 5.2 M/uL    HGB 12.0 11.7 - 15.4 g/dL    HCT 35.6 (L) 35.8 - 46.3 %    MCV 97.3 79.6 - 97.8 FL    MCH 32.8 26.1 - 32.9 PG    MCHC 33.7 31.4 - 35.0 g/dL    RDW 13.6 11.9 - 14.6 %    PLATELET 818 728 - 024 K/uL    MPV 12.4 (H) 9.4 - 12.3 FL ABSOLUTE NRBC 0.00 0.0 - 0.2 K/uL    DF AUTOMATED      NEUTROPHILS 89 (H) 43 - 78 %    LYMPHOCYTES 6 (L) 13 - 44 %    MONOCYTES 4 4.0 - 12.0 %    EOSINOPHILS 0 (L) 0.5 - 7.8 %    BASOPHILS 0 0.0 - 2.0 %    IMMATURE GRANULOCYTES 1 0.0 - 5.0 %    ABS. NEUTROPHILS 11.3 (H) 1.7 - 8.2 K/UL    ABS. LYMPHOCYTES 0.8 0.5 - 4.6 K/UL    ABS. MONOCYTES 0.6 0.1 - 1.3 K/UL    ABS. EOSINOPHILS 0.0 0.0 - 0.8 K/UL    ABS. BASOPHILS 0.0 0.0 - 0.2 K/UL    ABS. IMM. GRANS. 0.1 0.0 - 0.5 K/UL       All Micro Results     None          No current facility-administered medications for this encounter. Current Outpatient Medications   Medication Sig    venlafaxine-SR (EFFEXOR-XR) 37.5 mg capsule Take 1 Cap by mouth daily.  allopurinol (ZYLOPRIM) 300 mg tablet Take 1 Tab by mouth daily.  lisinopril (PRINIVIL, ZESTRIL) 10 mg tablet TAKE ONE (1) TABLET BY MOUTH DAILY.  levothyroxine (SYNTHROID) 75 mcg tablet Take 1 Tab by mouth Daily (before breakfast).  trimethoprim (TRIMPEX) 100 mg tablet Take 1 Tab by mouth two (2) times a day.  oxybutynin chloride XL (DITROPAN XL) 10 mg CR tablet TAKE ONE (1) TABLET BY MOUTH DAILY.  doxycycline (MONODOX) 100 mg capsule Take 1 Cap by mouth daily.  MYRBETRIQ 50 mg ER tablet Take 1 Tab by mouth daily. Indications: URINARY URGE INCONTINENCE    acetaminophen (TYLENOL) 650 mg CR tablet Take 500 mg by mouth every six (6) hours as needed for Pain.  aspirin delayed-release 81 mg tablet Take  by mouth daily. Other Studies:  Xr Chest Sngl V    Result Date: 7/2/2019  History: Fall yesterday. Left hip pain. LEFT HIP SERIES: There is a mildly angulated fracture at the junction of the left femoral head and neck. Osteoarthritis is present in both hips. Lower lumbar degenerative spondylosis is present. LEFT FEMUR AP AND LATERAL VIEWS: There is a fracture at the junction of the femoral head and neck. A left knee prosthesis is present.  PORTABLE CHEST X-RAY: Comparison is made to previous study October 10, 2011. There is no consolidation, pleural effusions or visible pneumothorax. IMPRESSION: Subcapital left hip fracture. Xr Hip Lt W Or Wo Pelv 2-3 Vws    Result Date: 7/2/2019  History: Fall yesterday. Left hip pain. LEFT HIP SERIES: There is a mildly angulated fracture at the junction of the left femoral head and neck. Osteoarthritis is present in both hips. Lower lumbar degenerative spondylosis is present. LEFT FEMUR AP AND LATERAL VIEWS: There is a fracture at the junction of the femoral head and neck. A left knee prosthesis is present. PORTABLE CHEST X-RAY: Comparison is made to previous study October 10, 2011. There is no consolidation, pleural effusions or visible pneumothorax. IMPRESSION: Subcapital left hip fracture. Xr Femur Lt 2 V    Result Date: 7/2/2019  History: Fall yesterday. Left hip pain. LEFT HIP SERIES: There is a mildly angulated fracture at the junction of the left femoral head and neck. Osteoarthritis is present in both hips. Lower lumbar degenerative spondylosis is present. LEFT FEMUR AP AND LATERAL VIEWS: There is a fracture at the junction of the femoral head and neck. A left knee prosthesis is present. PORTABLE CHEST X-RAY: Comparison is made to previous study October 10, 2011. There is no consolidation, pleural effusions or visible pneumothorax. IMPRESSION: Subcapital left hip fracture.       Assessment and Plan:     Hospital Problems as of 7/2/2019 Date Reviewed: 7/2/2019          Codes Class Noted - Resolved POA    * (Principal) Closed left hip fracture ([address] ICD-10-CM: M19.515O  ICD-9-CM: 820.8  7/2/2019 - Present Yes        Recurrent UTI ICD-10-CM: N39.0  ICD-9-CM: 599.0  4/25/2016 - Present Yes        Depression ICD-10-CM: F32.9  ICD-9-CM: 207  Unknown - Present Yes        Incontinence ICD-10-CM: R32  ICD-9-CM: 788.30  4/10/2014 - Present Yes        Neurogenic bladder ICD-10-CM: N31.9  ICD-9-CM: 596.54  2/20/2014 - Present Yes        Osteoarthritis of left knee ICD-10-CM: M17.12  ICD-9-CM: 715.96  3/29/2013 - Present Yes        HTN (hypertension) (Chronic) ICD-10-CM: I10  ICD-9-CM: 401.9  9/10/2010 - Present Yes        Hypothyroidism (Chronic) ICD-10-CM: E03.9  ICD-9-CM: 244.9  9/10/2010 - Present Yes              Plan:  # Left subcapital hip fracture   - Ortho to see. - Diet today, NPO midnight with anticipation of surgery tomorrow. Pain/nausea control.   - PT/OT/PPD/CM   - RCRI score is 1 (still waiting on sCr), mod risk of MACE for moderate risk procedure. # H/o frequent UTIs   - Last culture 6/26 grew kleb pna sens to TMP. She was given this by Urology a few weeks ago and advised to take until her f/u appointment, so I will continue this. - Oxybutynin can cause urinary retention which likely doesn't help this. # Urinary incontinence?    - On oxybutynin and Myrbetriq which seems counterintuitive. Unclear if retention or incontinence is the primary problem. # Hypothyroid   - Synthroid    # MDD   - SSRI    # HTN   - ACEi     # H/o gout   - allopurinol    Discharge planning: Rehab.   DVT ppx: SCDs pre-operatively  Code status:  Full  Estimated LOS:  Greater than 2 midnights  Risk:  high    Signed:  Adria Trivedi MD

## 2019-07-02 NOTE — ED NOTES
TRANSFER - OUT REPORT:    Verbal report given to Shanel South RN on Александр Franco  being transferred to [address] [place] [address] for routine progression of care       Report consisted of patients Situation, Background, Assessment and   Recommendations(SBAR). Information from the following report(s) SBAR was reviewed with the receiving nurse. Lines:   Peripheral IV 07/02/19 Left Antecubital (Active)   Site Assessment Clean, dry, & intact 7/2/2019  3:26 PM   Phlebitis Assessment 0 7/2/2019  3:26 PM   Infiltration Assessment 0 7/2/2019  3:26 PM   Dressing Status Clean, dry, & intact 7/2/2019  3:26 PM   Dressing Type Transparent 7/2/2019  3:26 PM   Hub Color/Line Status Pink 7/2/2019  3:26 PM        Opportunity for questions and clarification was provided.       Patient transported with:   [employer]

## 2019-07-03 ENCOUNTER — APPOINTMENT (OUTPATIENT)
Dept: GENERAL RADIOLOGY | Age: 79
DRG: 470 | End: 2019-07-03
Attending: ORTHOPAEDIC SURGERY
Payer: MEDICARE

## 2019-07-03 ENCOUNTER — ANESTHESIA (OUTPATIENT)
Dept: SURGERY | Age: 79
DRG: 470 | End: 2019-07-03
Payer: MEDICARE

## 2019-07-03 LAB
ANION GAP SERPL CALC-SCNC: 9 MMOL/L (ref 7–16)
APPEARANCE UR: ABNORMAL
BACTERIA SPEC CULT: NORMAL
BACTERIA URNS QL MICRO: 0 /HPF
BASOPHILS # BLD: 0 K/UL (ref 0–0.2)
BASOPHILS # BLD: 0.1 K/UL (ref 0–0.2)
BASOPHILS NFR BLD: 1 % (ref 0–2)
BASOPHILS NFR BLD: 1 % (ref 0–2)
BILIRUB UR QL: NEGATIVE
BUN SERPL-MCNC: 24 MG/DL (ref 8–23)
CALCIUM SERPL-MCNC: 8.6 MG/DL (ref 8.3–10.4)
CASTS URNS QL MICRO: 0 /LPF
CHLORIDE SERPL-SCNC: 111 MMOL/L (ref 98–107)
CO2 SERPL-SCNC: 19 MMOL/L (ref 21–32)
COLOR UR: YELLOW
CREAT SERPL-MCNC: 1.13 MG/DL (ref 0.6–1)
CRYSTALS URNS QL MICRO: 0 /LPF
DIFFERENTIAL METHOD BLD: ABNORMAL
DIFFERENTIAL METHOD BLD: ABNORMAL
EOSINOPHIL # BLD: 0.2 K/UL (ref 0–0.8)
EOSINOPHIL # BLD: 0.2 K/UL (ref 0–0.8)
EOSINOPHIL NFR BLD: 1 % (ref 0.5–7.8)
EOSINOPHIL NFR BLD: 3 % (ref 0.5–7.8)
EPI CELLS #/AREA URNS HPF: NORMAL /HPF
ERYTHROCYTE [DISTWIDTH] IN BLOOD BY AUTOMATED COUNT: 13.4 % (ref 11.9–14.6)
ERYTHROCYTE [DISTWIDTH] IN BLOOD BY AUTOMATED COUNT: 13.5 % (ref 11.9–14.6)
GLUCOSE SERPL-MCNC: 122 MG/DL (ref 65–100)
GLUCOSE UR STRIP.AUTO-MCNC: NEGATIVE MG/DL
HCT VFR BLD AUTO: 30.7 % (ref 35.8–46.3)
HCT VFR BLD AUTO: 31.2 % (ref 35.8–46.3)
HCT VFR BLD AUTO: 31.6 % (ref 35.8–46.3)
HGB BLD-MCNC: 10.4 G/DL (ref 11.7–15.4)
HGB BLD-MCNC: 10.4 G/DL (ref 11.7–15.4)
HGB BLD-MCNC: 10.5 G/DL (ref 11.7–15.4)
HGB UR QL STRIP: ABNORMAL
IMM GRANULOCYTES # BLD AUTO: 0 K/UL (ref 0–0.5)
IMM GRANULOCYTES # BLD AUTO: 0.1 K/UL (ref 0–0.5)
IMM GRANULOCYTES NFR BLD AUTO: 1 % (ref 0–5)
IMM GRANULOCYTES NFR BLD AUTO: 1 % (ref 0–5)
KETONES UR QL STRIP.AUTO: NEGATIVE MG/DL
LEUKOCYTE ESTERASE UR QL STRIP.AUTO: NEGATIVE
LYMPHOCYTES # BLD: 0.8 K/UL (ref 0.5–4.6)
LYMPHOCYTES # BLD: 1.2 K/UL (ref 0.5–4.6)
LYMPHOCYTES NFR BLD: 16 % (ref 13–44)
LYMPHOCYTES NFR BLD: 5 % (ref 13–44)
MCH RBC QN AUTO: 32.5 PG (ref 26.1–32.9)
MCH RBC QN AUTO: 32.5 PG (ref 26.1–32.9)
MCHC RBC AUTO-ENTMCNC: 33.3 G/DL (ref 31.4–35)
MCHC RBC AUTO-ENTMCNC: 33.9 G/DL (ref 31.4–35)
MCV RBC AUTO: 95.9 FL (ref 79.6–97.8)
MCV RBC AUTO: 97.5 FL (ref 79.6–97.8)
MM INDURATION POC: 0 MM (ref 0–5)
MONOCYTES # BLD: 0.5 K/UL (ref 0.1–1.3)
MONOCYTES # BLD: 0.8 K/UL (ref 0.1–1.3)
MONOCYTES NFR BLD: 5 % (ref 4–12)
MONOCYTES NFR BLD: 7 % (ref 4–12)
MUCOUS THREADS URNS QL MICRO: 0 /LPF
NEUTS SEG # BLD: 14.2 K/UL (ref 1.7–8.2)
NEUTS SEG # BLD: 5.7 K/UL (ref 1.7–8.2)
NEUTS SEG NFR BLD: 74 % (ref 43–78)
NEUTS SEG NFR BLD: 88 % (ref 43–78)
NITRITE UR QL STRIP.AUTO: NEGATIVE
NRBC # BLD: 0 K/UL (ref 0–0.2)
NRBC # BLD: 0 K/UL (ref 0–0.2)
OTHER OBSERVATIONS,UCOM: NORMAL
PH UR STRIP: 5.5 [PH] (ref 5–9)
PLATELET # BLD AUTO: 144 K/UL (ref 150–450)
PLATELET # BLD AUTO: 147 K/UL (ref 150–450)
PMV BLD AUTO: 12.2 FL (ref 9.4–12.3)
PMV BLD AUTO: 12.3 FL (ref 9.4–12.3)
POTASSIUM SERPL-SCNC: 3.7 MMOL/L (ref 3.5–5.1)
PPD POC: NEGATIVE NEGATIVE
PROT UR STRIP-MCNC: NEGATIVE MG/DL
RBC # BLD AUTO: 3.2 M/UL (ref 4.05–5.2)
RBC # BLD AUTO: 3.2 M/UL (ref 4.05–5.2)
RBC #/AREA URNS HPF: NORMAL /HPF
SERVICE CMNT-IMP: NORMAL
SODIUM SERPL-SCNC: 139 MMOL/L (ref 136–145)
SP GR UR REFRACTOMETRY: 1.02 (ref 1–1.02)
UROBILINOGEN UR QL STRIP.AUTO: 0.2 EU/DL (ref 0.2–1)
WBC # BLD AUTO: 16.1 K/UL (ref 4.3–11.1)
WBC # BLD AUTO: 7.7 K/UL (ref 4.3–11.1)
WBC URNS QL MICRO: NORMAL /HPF

## 2019-07-03 PROCEDURE — 77030013727 HC IRR FAN PULSVC ZIMM -B: Performed by: ORTHOPAEDIC SURGERY

## 2019-07-03 PROCEDURE — 77030021907 HC KT URIN FOL O&M -A

## 2019-07-03 PROCEDURE — 77030006835 HC BLD SAW SAG STRY -B: Performed by: ORTHOPAEDIC SURGERY

## 2019-07-03 PROCEDURE — 74011250636 HC RX REV CODE- 250/636: Performed by: ORTHOPAEDIC SURGERY

## 2019-07-03 PROCEDURE — 74011250637 HC RX REV CODE- 250/637: Performed by: NURSE PRACTITIONER

## 2019-07-03 PROCEDURE — 65270000029 HC RM PRIVATE

## 2019-07-03 PROCEDURE — 77030018836 HC SOL IRR NACL ICUM -A: Performed by: ORTHOPAEDIC SURGERY

## 2019-07-03 PROCEDURE — 81015 MICROSCOPIC EXAM OF URINE: CPT

## 2019-07-03 PROCEDURE — 77030031139 HC SUT VCRL2 J&J -A: Performed by: ORTHOPAEDIC SURGERY

## 2019-07-03 PROCEDURE — 74011250637 HC RX REV CODE- 250/637: Performed by: ORTHOPAEDIC SURGERY

## 2019-07-03 PROCEDURE — 74011250636 HC RX REV CODE- 250/636: Performed by: ANESTHESIOLOGY

## 2019-07-03 PROCEDURE — 77030002966 HC SUT PDS J&J -A: Performed by: ORTHOPAEDIC SURGERY

## 2019-07-03 PROCEDURE — 77030007880 HC KT SPN EPDRL BBMI -B: Performed by: ANESTHESIOLOGY

## 2019-07-03 PROCEDURE — 74011250637 HC RX REV CODE- 250/637: Performed by: ANESTHESIOLOGY

## 2019-07-03 PROCEDURE — 77030003665 HC NDL SPN BBMI -A: Performed by: ANESTHESIOLOGY

## 2019-07-03 PROCEDURE — 77030039266 HC ADH SKN EXOFIN S2SG -A: Performed by: ORTHOPAEDIC SURGERY

## 2019-07-03 PROCEDURE — 73502 X-RAY EXAM HIP UNI 2-3 VIEWS: CPT

## 2019-07-03 PROCEDURE — 81003 URINALYSIS AUTO W/O SCOPE: CPT

## 2019-07-03 PROCEDURE — 80048 BASIC METABOLIC PNL TOTAL CA: CPT

## 2019-07-03 PROCEDURE — 87086 URINE CULTURE/COLONY COUNT: CPT

## 2019-07-03 PROCEDURE — C1776 JOINT DEVICE (IMPLANTABLE): HCPCS | Performed by: ORTHOPAEDIC SURGERY

## 2019-07-03 PROCEDURE — 36415 COLL VENOUS BLD VENIPUNCTURE: CPT

## 2019-07-03 PROCEDURE — 76210000017 HC OR PH I REC 1.5 TO 2 HR: Performed by: ORTHOPAEDIC SURGERY

## 2019-07-03 PROCEDURE — 76060000034 HC ANESTHESIA 1.5 TO 2 HR: Performed by: ORTHOPAEDIC SURGERY

## 2019-07-03 PROCEDURE — 74011250636 HC RX REV CODE- 250/636: Performed by: INTERNAL MEDICINE

## 2019-07-03 PROCEDURE — 77030039267 HC ADH SKN EXOFIN S2SG -B: Performed by: ORTHOPAEDIC SURGERY

## 2019-07-03 PROCEDURE — 97161 PT EVAL LOW COMPLEX 20 MIN: CPT

## 2019-07-03 PROCEDURE — 85018 HEMOGLOBIN: CPT

## 2019-07-03 PROCEDURE — 77030018673: Performed by: ORTHOPAEDIC SURGERY

## 2019-07-03 PROCEDURE — 74011000250 HC RX REV CODE- 250

## 2019-07-03 PROCEDURE — 74011250636 HC RX REV CODE- 250/636

## 2019-07-03 PROCEDURE — 77030020255 HC SOL INJ LR 1000ML BG

## 2019-07-03 PROCEDURE — 88305 TISSUE EXAM BY PATHOLOGIST: CPT

## 2019-07-03 PROCEDURE — 88311 DECALCIFY TISSUE: CPT

## 2019-07-03 PROCEDURE — 77030018547 HC SUT ETHBND1 J&J -B: Performed by: ORTHOPAEDIC SURGERY

## 2019-07-03 PROCEDURE — 99221 1ST HOSP IP/OBS SF/LOW 40: CPT | Performed by: PHYSICAL MEDICINE & REHABILITATION

## 2019-07-03 PROCEDURE — 77030012935 HC DRSG AQUACEL BMS -B: Performed by: ORTHOPAEDIC SURGERY

## 2019-07-03 PROCEDURE — 74011000258 HC RX REV CODE- 258: Performed by: ORTHOPAEDIC SURGERY

## 2019-07-03 PROCEDURE — 85025 COMPLETE CBC W/AUTO DIFF WBC: CPT

## 2019-07-03 PROCEDURE — 97530 THERAPEUTIC ACTIVITIES: CPT

## 2019-07-03 PROCEDURE — 76010000162 HC OR TIME 1.5 TO 2 HR INTENSV-TIER 1: Performed by: ORTHOPAEDIC SURGERY

## 2019-07-03 PROCEDURE — 77030002933 HC SUT MCRYL J&J -A: Performed by: ORTHOPAEDIC SURGERY

## 2019-07-03 PROCEDURE — 0SRS0JZ REPLACEMENT OF LEFT HIP JOINT, FEMORAL SURFACE WITH SYNTHETIC SUBSTITUTE, OPEN APPROACH: ICD-10-PCS | Performed by: ORTHOPAEDIC SURGERY

## 2019-07-03 PROCEDURE — 74011250637 HC RX REV CODE- 250/637: Performed by: INTERNAL MEDICINE

## 2019-07-03 DEVICE — ACTIS DUOFIX HIP PROSTHESIS (FEMORAL STEM 12/14 TAPER CEMENTLESS SIZE 7 HIGH COLLAR)  CE
Type: IMPLANTABLE DEVICE | Site: HIP | Status: FUNCTIONAL
Brand: ACTIS

## 2019-07-03 DEVICE — SELF CENTERING BI-POLAR HEAD 28MM ID 52MM OD
Type: IMPLANTABLE DEVICE | Site: HIP | Status: FUNCTIONAL
Brand: SELF CENTERING

## 2019-07-03 DEVICE — HEAD FEM DIA28MM NK L+1.5MM HIP MTL ON MTL 12/14 TAPR: Type: IMPLANTABLE DEVICE | Site: HIP | Status: FUNCTIONAL

## 2019-07-03 RX ORDER — TRIMETHOPRIM 100 MG/1
100 TABLET ORAL DAILY
Status: DISCONTINUED | OUTPATIENT
Start: 2019-07-04 | End: 2019-07-06 | Stop reason: HOSPADM

## 2019-07-03 RX ORDER — FERROUS SULFATE, DRIED 160(50) MG
1 TABLET, EXTENDED RELEASE ORAL
Status: DISCONTINUED | OUTPATIENT
Start: 2019-07-03 | End: 2019-07-06 | Stop reason: HOSPADM

## 2019-07-03 RX ORDER — OXYCODONE HYDROCHLORIDE 5 MG/1
5 TABLET ORAL
Status: DISCONTINUED | OUTPATIENT
Start: 2019-07-03 | End: 2019-07-03 | Stop reason: HOSPADM

## 2019-07-03 RX ORDER — TRIMETHOPRIM 100 MG/1
100 TABLET ORAL 2 TIMES DAILY
Status: DISCONTINUED | OUTPATIENT
Start: 2019-07-03 | End: 2019-07-03

## 2019-07-03 RX ORDER — HYDROMORPHONE HYDROCHLORIDE 2 MG/ML
0.5 INJECTION, SOLUTION INTRAMUSCULAR; INTRAVENOUS; SUBCUTANEOUS
Status: DISCONTINUED | OUTPATIENT
Start: 2019-07-03 | End: 2019-07-03 | Stop reason: HOSPADM

## 2019-07-03 RX ORDER — PROPOFOL 10 MG/ML
INJECTION, EMULSION INTRAVENOUS
Status: DISCONTINUED | OUTPATIENT
Start: 2019-07-03 | End: 2019-07-03 | Stop reason: HOSPADM

## 2019-07-03 RX ORDER — LIDOCAINE HYDROCHLORIDE 10 MG/ML
0.1 INJECTION INFILTRATION; PERINEURAL AS NEEDED
Status: DISCONTINUED | OUTPATIENT
Start: 2019-07-03 | End: 2019-07-03 | Stop reason: HOSPADM

## 2019-07-03 RX ORDER — SODIUM CHLORIDE, SODIUM LACTATE, POTASSIUM CHLORIDE, CALCIUM CHLORIDE 600; 310; 30; 20 MG/100ML; MG/100ML; MG/100ML; MG/100ML
INJECTION, SOLUTION INTRAVENOUS
Status: DISCONTINUED | OUTPATIENT
Start: 2019-07-03 | End: 2019-07-03 | Stop reason: HOSPADM

## 2019-07-03 RX ORDER — MAG HYDROX/ALUMINUM HYD/SIMETH 200-200-20
30 SUSPENSION, ORAL (FINAL DOSE FORM) ORAL
Status: DISCONTINUED | OUTPATIENT
Start: 2019-07-03 | End: 2019-07-06 | Stop reason: HOSPADM

## 2019-07-03 RX ORDER — SODIUM CHLORIDE 0.9 % (FLUSH) 0.9 %
5-40 SYRINGE (ML) INJECTION EVERY 8 HOURS
Status: DISCONTINUED | OUTPATIENT
Start: 2019-07-03 | End: 2019-07-03 | Stop reason: HOSPADM

## 2019-07-03 RX ORDER — OXYCODONE HYDROCHLORIDE 5 MG/1
10 TABLET ORAL
Status: DISCONTINUED | OUTPATIENT
Start: 2019-07-03 | End: 2019-07-03 | Stop reason: HOSPADM

## 2019-07-03 RX ORDER — ENOXAPARIN SODIUM 100 MG/ML
40 INJECTION SUBCUTANEOUS DAILY
Status: DISCONTINUED | OUTPATIENT
Start: 2019-07-04 | End: 2019-07-06 | Stop reason: HOSPADM

## 2019-07-03 RX ORDER — BUPIVACAINE HYDROCHLORIDE 7.5 MG/ML
INJECTION, SOLUTION INTRASPINAL
Status: COMPLETED | OUTPATIENT
Start: 2019-07-03 | End: 2019-07-03

## 2019-07-03 RX ORDER — FAMOTIDINE 20 MG/1
20 TABLET, FILM COATED ORAL ONCE
Status: COMPLETED | OUTPATIENT
Start: 2019-07-03 | End: 2019-07-03

## 2019-07-03 RX ORDER — ONDANSETRON 2 MG/ML
INJECTION INTRAMUSCULAR; INTRAVENOUS AS NEEDED
Status: DISCONTINUED | OUTPATIENT
Start: 2019-07-03 | End: 2019-07-03

## 2019-07-03 RX ORDER — SODIUM CHLORIDE, SODIUM LACTATE, POTASSIUM CHLORIDE, CALCIUM CHLORIDE 600; 310; 30; 20 MG/100ML; MG/100ML; MG/100ML; MG/100ML
100 INJECTION, SOLUTION INTRAVENOUS CONTINUOUS
Status: DISCONTINUED | OUTPATIENT
Start: 2019-07-03 | End: 2019-07-06 | Stop reason: HOSPADM

## 2019-07-03 RX ORDER — CEFAZOLIN SODIUM/WATER 2 G/20 ML
2 SYRINGE (ML) INTRAVENOUS ONCE
Status: COMPLETED | OUTPATIENT
Start: 2019-07-03 | End: 2019-07-03

## 2019-07-03 RX ORDER — SODIUM CHLORIDE 0.9 % (FLUSH) 0.9 %
5-40 SYRINGE (ML) INJECTION EVERY 8 HOURS
Status: DISCONTINUED | OUTPATIENT
Start: 2019-07-03 | End: 2019-07-06 | Stop reason: HOSPADM

## 2019-07-03 RX ORDER — SODIUM CHLORIDE, SODIUM LACTATE, POTASSIUM CHLORIDE, CALCIUM CHLORIDE 600; 310; 30; 20 MG/100ML; MG/100ML; MG/100ML; MG/100ML
75 INJECTION, SOLUTION INTRAVENOUS CONTINUOUS
Status: DISCONTINUED | OUTPATIENT
Start: 2019-07-03 | End: 2019-07-03 | Stop reason: HOSPADM

## 2019-07-03 RX ORDER — SODIUM CHLORIDE 0.9 % (FLUSH) 0.9 %
5-40 SYRINGE (ML) INJECTION AS NEEDED
Status: DISCONTINUED | OUTPATIENT
Start: 2019-07-03 | End: 2019-07-03 | Stop reason: HOSPADM

## 2019-07-03 RX ORDER — SODIUM CHLORIDE 0.9 % (FLUSH) 0.9 %
5-40 SYRINGE (ML) INJECTION AS NEEDED
Status: DISCONTINUED | OUTPATIENT
Start: 2019-07-03 | End: 2019-07-06 | Stop reason: HOSPADM

## 2019-07-03 RX ORDER — SODIUM CHLORIDE 9 MG/ML
75 INJECTION, SOLUTION INTRAVENOUS CONTINUOUS
Status: DISCONTINUED | OUTPATIENT
Start: 2019-07-03 | End: 2019-07-03 | Stop reason: HOSPADM

## 2019-07-03 RX ORDER — UREA 10 %
2 LOTION (ML) TOPICAL 2 TIMES DAILY
Status: DISCONTINUED | OUTPATIENT
Start: 2019-07-03 | End: 2019-07-06 | Stop reason: HOSPADM

## 2019-07-03 RX ORDER — PROPOFOL 10 MG/ML
INJECTION, EMULSION INTRAVENOUS AS NEEDED
Status: DISCONTINUED | OUTPATIENT
Start: 2019-07-03 | End: 2019-07-03 | Stop reason: HOSPADM

## 2019-07-03 RX ADMIN — BUPIVACAINE HYDROCHLORIDE 9 MG: 7.5 INJECTION, SOLUTION INTRASPINAL at 11:26

## 2019-07-03 RX ADMIN — CALCIUM CARBONATE 500 MG (1,250 MG)-VITAMIN D3 200 UNIT TABLET 1 TABLET: at 17:08

## 2019-07-03 RX ADMIN — ALLOPURINOL 300 MG: 100 TABLET ORAL at 08:50

## 2019-07-03 RX ADMIN — TRIMETHOPRIM 100 MG: 100 TABLET ORAL at 08:50

## 2019-07-03 RX ADMIN — ACETAMINOPHEN 650 MG: 325 TABLET, FILM COATED ORAL at 05:39

## 2019-07-03 RX ADMIN — OXYCODONE HYDROCHLORIDE 5 MG: 5 TABLET ORAL at 16:21

## 2019-07-03 RX ADMIN — OXYCODONE HYDROCHLORIDE 5 MG: 5 TABLET ORAL at 21:23

## 2019-07-03 RX ADMIN — ONDANSETRON 4 MG: 2 INJECTION INTRAMUSCULAR; INTRAVENOUS at 12:41

## 2019-07-03 RX ADMIN — VENLAFAXINE HYDROCHLORIDE 37.5 MG: 37.5 CAPSULE, EXTENDED RELEASE ORAL at 08:50

## 2019-07-03 RX ADMIN — SODIUM CHLORIDE, SODIUM LACTATE, POTASSIUM CHLORIDE, AND CALCIUM CHLORIDE 75 ML/HR: 600; 310; 30; 20 INJECTION, SOLUTION INTRAVENOUS at 10:13

## 2019-07-03 RX ADMIN — Medication 10 ML: at 16:24

## 2019-07-03 RX ADMIN — FAMOTIDINE 20 MG: 20 TABLET ORAL at 10:13

## 2019-07-03 RX ADMIN — LEVOTHYROXINE SODIUM 75 MCG: 75 TABLET ORAL at 05:39

## 2019-07-03 RX ADMIN — SODIUM CHLORIDE, SODIUM LACTATE, POTASSIUM CHLORIDE, CALCIUM CHLORIDE: 600; 310; 30; 20 INJECTION, SOLUTION INTRAVENOUS at 11:11

## 2019-07-03 RX ADMIN — SODIUM CHLORIDE 1000 MG: 900 INJECTION, SOLUTION INTRAVENOUS at 21:24

## 2019-07-03 RX ADMIN — Medication 2 G: at 11:42

## 2019-07-03 RX ADMIN — LACTOBACILLUS TAB 2 TABLET: TAB at 21:24

## 2019-07-03 RX ADMIN — PROPOFOL 75 MCG/KG/MIN: 10 INJECTION, EMULSION INTRAVENOUS at 11:30

## 2019-07-03 RX ADMIN — PROPOFOL 50 MG: 10 INJECTION, EMULSION INTRAVENOUS at 11:17

## 2019-07-03 RX ADMIN — HYDROMORPHONE HYDROCHLORIDE 0.5 MG: 2 INJECTION INTRAMUSCULAR; INTRAVENOUS; SUBCUTANEOUS at 13:21

## 2019-07-03 RX ADMIN — Medication 10 ML: at 21:26

## 2019-07-03 RX ADMIN — SODIUM CHLORIDE 100 ML/HR: 900 INJECTION, SOLUTION INTRAVENOUS at 00:50

## 2019-07-03 RX ADMIN — Medication 10 ML: at 05:39

## 2019-07-03 RX ADMIN — SODIUM CHLORIDE, SODIUM LACTATE, POTASSIUM CHLORIDE, AND CALCIUM CHLORIDE 100 ML/HR: 600; 310; 30; 20 INJECTION, SOLUTION INTRAVENOUS at 16:24

## 2019-07-03 NOTE — PROGRESS NOTES
CM attempted to see patient for initial discharge planning consult however patient was out of room for surgery. CM will attempt later today.

## 2019-07-03 NOTE — PROGRESS NOTES
Referrals sent for [facility], [facility], and [facility] per family request for STR. CM will continue to follow.

## 2019-07-03 NOTE — PROGRESS NOTES
Problem: Patient Education: Go to Patient Education Activity  Goal: Patient/Family Education  Outcome: Progressing Towards Goal     Problem: Falls - Risk of  Goal: *Absence of Falls  Description  Document [place] Payment Fall Risk and appropriate interventions in the flowsheet. Outcome: Progressing Towards Goal     Problem: Patient Education: Go to Patient Education Activity  Goal: Patient/Family Education  Outcome: Progressing Towards Goal     Problem: Pressure Injury - Risk of  Goal: *Prevention of pressure injury  Description  Document Jorge Scale and appropriate interventions in the flowsheet.   Outcome: Progressing Towards Goal     Problem: Patient Education: Go to Patient Education Activity  Goal: Patient/Family Education  Outcome: Progressing Towards Goal

## 2019-07-03 NOTE — PROGRESS NOTES
07/03/19 1514   Dual Skin Pressure Injury Assessment   Dual Skin Pressure Injury Assessment WDL   Second Care Provider (Based on [address] [place] [address]) Prabha Marie RN   Skin Integumentary   Skin Integumentary (WDL) X   Skin Color Appropriate for ethnicity; Ecchymosis (comment)   Skin Condition/Temp Warm;Dry   Skin Integrity Incision (comment)  (Incision: LLE)   Turgor Epidermis thin w/ loss of subcut tissue   Wound Prevention and Protection Methods   Orientation of Wound Prevention Posterior   Location of Wound Prevention Sacrum/Coccyx   Dressing Present  No   Wound Offloading (Prevention Methods) Bed, pressure redistribution/air;Bed, pressure reduction mattress;Pillows;Repositioning;Turning

## 2019-07-03 NOTE — PROGRESS NOTES
Occupational Therapy Note: orders received, chart reviewed and noted pt is having L hip hemiarthroplasty surgery today. Will HOLD OT eval and check back after surgery.   Thank you for this referral.Levi Sauceda MS, OTR/L

## 2019-07-03 NOTE — PROGRESS NOTES
Problem: Mobility Impaired (Adult and Pediatric)  Goal: *Acute Goals and Plan of Care (Insert Text)  Description  LTG:  (1.)Ms. Kavon Díaz will move from supine to sit and sit to supine , scoot up and down and roll side to side with MINIMAL ASSIST within 7 treatment day(s). (2.)Ms. Kavon Díaz will transfer from bed to chair and chair to bed with MINIMAL ASSIST using the least restrictive device within 7 treatment day(s). (3.)Ms. Kavon Díaz will ambulate with MODERATE ASSIST for 10 feet with the least restrictive device within 7 treatment day(s). (4.)Ms. Kavon Díaz will perform exercises per HEP for 10+ minutes to improve strength and mobility within 7 days. ________________________________________________________________________________________________   Outcome: Progressing Towards Goal     PHYSICAL THERAPY: Initial Assessment and PM 7/3/2019  INPATIENT: PT Visit Days : 1  [place] / Plan[place] PART A [place] B / Product Type: Medicare /       NAME/AGE/GENDER: Dana Morales is a 69 y.o. female   PRIMARY DIAGNOSIS: Closed left hip fracture ([facility] [address] [S72.002A] Closed left hip fracture (HCC)   Closed left hip fracture (HCC)    Procedure(s) (LRB):  LEFT HIP HEMIARTHROPLASTY (Left)  Day of Surgery  ICD-10: Treatment Diagnosis:    Generalized Muscle Weakness (M62.81)  Difficulty in walking, Not elsewhere classified (R26.2)  Other abnormalities of gait and mobility (R26.89)  History of falling (Z91.81)   Precaution/Allergies:  Adhesive tape-silicones; Clarithromycin; Lortab [hydrocodone-acetaminophen]; Other medication; Peanut; Prednisone; and Prevacid [lansoprazole]      ASSESSMENT:     Ms. Kavon Díaz is a 69year old female admitted from home with left hip fracture after fall at home; she is s/p left hip hemiarthroplasty and is WBAT per ortho orders. At baseline pt lives at home alone and is ambulatory with cane or walker around home.  Does not drive- family lives locally and can assist. She presents in supine post op and is agreeable to therapy assessment. C/o post op pain during mobility. Pt requires max assist for bed mobility and transfer to sitting. Immediately demonstrates poor seated balance with strong, heavy posterior trunk lean and needs constant mod-max assist/support. BP initially reading 90's/50's with pt mildly symptomatic. Worked on seated balance, trunk control, and weight shifts to position herself at edge of bed (max assist). BP improves to 100's/70 with continued poor balance. Attempted sit-stand transfers x 4 with max assist of 2 and heavy assist, cueing for body mechanics and technique. Poor standing balance and unable to take steps. Max assist of 2 for return to supine. Performed lateral transfers/slide board transfer from bed to chair with total assist of 2. Positioned comfortably with LEs elevated, chair alarm on, and KI donned. Family at bedside. RN notified of pt request for pain medicine. John Motley is functioning well below baseline with strength, mobility, balance, transfers, and activity tolerance and will benefit from continued therapy during hospital stay to address deficits/maximize independence with mobility. Will need rehab at discharge.      This section established at most recent assessment   PROBLEM LIST (Impairments causing functional limitations):  Decreased Strength  Decreased ADL/Functional Activities  Decreased Transfer Abilities  Decreased Ambulation Ability/Technique  Decreased Balance  Increased Pain  Decreased Activity Tolerance  Decreased Knowledge of Precautions  Decreased Skin Integrity/Hygeine  Decreased Cognition   INTERVENTIONS PLANNED: (Benefits and precautions of physical therapy have been discussed with the patient.)  Balance Exercise  Bed Mobility  Gait Training  Home Exercise Program (HEP)  Therapeutic Activites  Therapeutic Exercise/Strengthening  Transfer Training     TREATMENT PLAN: Frequency/Duration: twice daily for duration of hospital stay  Rehabilitation Potential For Stated Goals: [facility] (at time of discharge pending progress):    Placement: It is my opinion, based on this patient's performance to date, that Ms. Dominic Tinsley may benefit from intensive therapy at a [address] [place] [address] after discharge due to the functional deficits listed above that are likely to improve with skilled rehabilitation and concerns that he/she may be unsafe to be unsupervised at home due to fall, weakness, limited mobility, post op pain, need for significant assist with mobility . Equipment:   Tbd pending progress               HISTORY:   History of Present Injury/Illness (Reason for Referral):  Per H&P, \"Mrs. Dominic Tinsley is a nice 68 y/o WF with a h/o frequent UTIs on daily antibiotic suppression (TMP and doxycycline), hypothyroidism, CVA, HTN, MDD presenting s/p fall yesterday at home. She was in the kitchen cooking and was trying to \"move too quickly\" when she lost her balance and fell onto her left side. She noticed pain of the left hip but managed somewhat ok throughout yesterday, but pain persisted so she came to the ED today. Imaging shows left subcapital hip fracture. She denies chest pain, SOB, palpitations, dizziness or syncope at any point during her fall yesterday. Family is present and note that she has had frequent falls over the past several months, but has not had a fall in a few weeks. They thought it was neuropathy. She also recently saw Urology for another UTI, culture grew klebsiella pna sensitive to TMP (but resistant to doxy) which is what she was prescribed (in addition to daily doxycycline).  Hospitalist consulted for admission\"    Past Medical History/Comorbidities:   Ms. Dominic Tinsley  has a past medical history of Acute on Chronic UTI (9/10/2010), Allergic rhinitis, AMD (age-related macular degeneration), bilateral, ARF (acute renal failure) ([facility] [address] (12/13/2010), Arrhythmia, Ataxia, C. difficile colitis (12/17/2010), Calculus of kidney (2/20/2014), Cancer [facility]), Chest pain, Cystocele, midline (2/20/2014), Depression, Falls frequently, GERD (gastroesophageal reflux disease), Gross hematuria (2/20/2014), Headache, Hematuria, microscopic, Hypertension, Hypomagnesemia (12/13/2010), Hypothyroidism, Incomplete bladder emptying (2/20/2014), Incontinence (4/10/2014), Kidney stones, Menopausal syndrome, Microscopic hematuria (2/20/2014), Mixed incontinence urge and stress (male)(female) (2/20/2014), Neurogenic bladder, NOS (2/20/2014), Neuropathy, Obese, Osteoarthritis of left knee (3/29/2013), Other chronic cystitis (2/20/2014), Other musculoskeletal symptoms referable to limbs(729.89) (2/20/2014), Other nonspecific finding on examination of urine (2/20/2014), Pneumonia RLL infiltrate (12/17/2010), Polyneuropathy, Postmenopausal atrophic vaginitis (2/20/2014), Sepsis (9/10/2010), Splitting of urinary stream (2/20/2014), Stroke ([facility] [address] Total knee replacement status (3/29/2013), Unspecified disorder of bladder (2/20/2014), Unspecified pyelonephritis (9/10/2010), Unspecified urinary incontinence (2/20/2014), Urge incontinence (2/20/2014), and UTI (urinary tract infection) Enterobacter (12/17/2010).   Ms. Neelam Gloria  has a past surgical history that includes hx heent; hx other surgical; hx heart catheterization (> 5 years ago); hx hysterectomy; hx knee arthroscopy; hx mohs procedure; hx orthopaedic; hx urological; hx urological; hx urological; hx other surgical; hx other surgical; hx other surgical; hx back surgery; hx colonoscopy; and hx other surgical.  Social History/Living Environment:   Home Environment: Private residence  # Steps to Enter: 3  Rails to Enter: Yes  Hand Rails : Bilateral  Wheelchair Ramp: No  One/Two Story Residence: One story  Living Alone: Yes  Support Systems: Child(cyndi), Family member(s)  Patient Expects to be Discharged to[de-identified] Rehabilitation facility  Current DME Used/Available at Home: Claudine White, angela, Radha Mcdonald, straight, Shower chair, Commode, bedside  Prior Level of Function/Work/Activity:  Lives alone in single story home with 3 steps. Mod I in home with cane or walker. Family assists with transportation. Number of Personal Factors/Comorbidities that affect the Plan of Care: 1-2: MODERATE COMPLEXITY   EXAMINATION:   Most Recent Physical Functioning:   Gross Assessment:  AROM: Generally decreased, functional  Strength: Generally decreased, functional  Coordination: Generally decreased, functional               Posture:  Posture (WDL): Exceptions to WDL  Posture Assessment: Forward head, Rounded shoulders  Balance:  Sitting: Impaired  Sitting - Static: Poor (constant support)  Sitting - Dynamic: Poor (constant support)  Standing: Impaired  Standing - Static: Poor  Standing - Dynamic : Poor Bed Mobility:  Rolling: Maximum assistance  Supine to Sit: Maximum assistance  Sit to Supine: Maximum assistance;Assist x2  Scooting: Total assistance;Assist x2  Wheelchair Mobility:     Transfers:  Sit to Stand: Maximum assistance;Assist x2  Stand to Sit: Maximum assistance;Assist x2  Bed to Chair: Total assistance  Sliding Board : Total assistance  Interventions: Verbal cues; Visual cues; Safety awareness training; Tactile cues;Manual cues  Duration: 25 Minutes  Gait:  Right Side Weight Bearing: Full  Left Side Weight Bearing: As tolerated         Body Structures Involved:  Bones  Joints  Muscles Body Functions Affected:  Mental  Sensory/Pain  Movement Related  Skin Related Activities and Participation Affected:  General Tasks and Demands  Mobility  Domestic Life  Community, Social and [place]   Number of elements that affect the Plan of Care: 4+: HIGH COMPLEXITY   CLINICAL PRESENTATION:   Presentation: Stable and uncomplicated: LOW COMPLEXITY   CLINICAL DECISION MAKIN [address]? ?6 Clicks? Basic Mobility Inpatient Short Form  How much difficulty does the patient currently have. .. Unable A Lot A Little None   1. Turning over in bed (including adjusting bedclothes, sheets and blankets)? ? 1   ? 2   ? 3   ? 4   2. Sitting down on and standing up from a chair with arms ( e.g., wheelchair, bedside commode, etc.)   ? 1   ? 2   ? 3   ? 4   3. Moving from lying on back to sitting on the side of the bed?   ? 1   ? 2   ? 3   ? 4   How much help from another person does the patient currently need. .. Total A Lot A Little None   4. Moving to and from a bed to a chair (including a wheelchair)? ? 1   ? 2   ? 3   ? 4   5. Need to walk in hospital room? ? 1   ? 2   ? 3   ? 4   6. Climbing 3-5 steps with a railing? ? 1   ? 2   ? 3   ? 4   © 2007, Trustees of [address] [place] [address] [address] [id number], under license to [employer]. All rights reserved      Score:  Initial: 9 Most Recent: X (Date: -- )    Interpretation of Tool:  Represents activities that are increasingly more difficult (i.e. Bed mobility, Transfers, Gait). Medical Necessity:     Patient demonstrates good   rehab potential due to higher previous functional level. Reason for Services/Other Comments:  Patient continues to demonstrate capacity to improve strength, mobility, balance, transfers, activity tolerance which will increase independence, decrease amount of assistance required from caregiver and increase safety  .    Use of outcome tool(s) and clinical judgement create a POC that gives a: Clear prediction of patient's progress: LOW COMPLEXITY            TREATMENT:   (In addition to Assessment/Re-Assessment sessions the following treatments were rendered)   Pre-treatment Symptoms/Complaints:  \"thank you\"  Pain: Initial:   Pain Intensity 1: 3  Pain Location 1: Hip  Pain Orientation 1: Left  Pain Intervention(s) 1: Repositioned  Post Session:  0/10     Therapeutic Activity: (  25 Minutes ):  Therapeutic activities including Bed mobility (supine<->sit transfers), seated balance and functional activities, sit-stand transfers x 4 trials, standing balance, Chair transfers via sliding board transfer to improve mobility, strength, balance, coordination and activity tolerance . Required maximal assist of 1-2   to promote static and dynamic balance in standing and promote motor control of bilateral, lower extremity(s). Braces/Orthotics/Lines/Etc:   man catheter  O2 Device: Nasal cannula  Treatment/Session Assessment:    Response to Treatment:  pt performs mobility with max-total assist of 1-2. Strong posterior trunk lean  Interdisciplinary Collaboration:   Physical Therapist  Registered Nurse  After treatment position/precautions:   Up in chair  Bed alarm/tab alert on  Bed/Chair-wheels locked  Bed in low position  Call light within reach  RN notified  Family at bedside   Compliance with Program/Exercises: Will assess as treatment progresses  Recommendations/Intent for next treatment session: \"Next visit will focus on advancements to more challenging activities and reduction in assistance provided\".   Total Treatment Duration:  PT Patient Time In/Time Out  Time In: 1525  Time Out: 1 [facility], [place]

## 2019-07-03 NOTE — CONSULTS
Physical Medicine & Rehabilitation Note-consult    Patient: Elena Pickard MRN: [id number]  SSN: [id number]    YOB: 1940  Age: 69 y.o. Sex: female      Admit Date: 7/2/2019  Admitting Physician: Jef Singleton MD    Medical Decision Making/Plan/Recommend: Recommend sub acute rehab at SNF. Best care option is admission to SNF and sub acute rehab program. Patient has non acute medical conditions described below and functional deficits. Patient will benefit from daily skilled rehabilitation efforts and regular medical and nursing care at SNF. Plans discussed with family and they agree. Continue PT, OT for LLE strengthening, mobility, transfers, and gait training. Will follow progress. Chief Complaint : Gait dysfunction secondary to below. Admit Diagnosis: Closed left hip fracture ([address] [S72.002A]  left femoral neck fracture  left hip kenny arthroplasty  07/03/2019  Pain  DVT risk  HTN (hypertension)  Osteoarthritis of left knee   Neurogenic bladder   Incontinence   Depression   Recurrent UTI    Acute Rehab Dx:  Debility    deconditioning  Mobility and ambulation deficits  Self Care/ADL deficits    Subjective:     Date of Evaluation:  July 4, 2019    HPI: Elena Pickard is a 69 y.o. female patient at [address] [place] [address] who was admitted on 7/2/2019  by Jef Singleton MD with below mentioned medical history ,is being seen for Physical Medicine and Rehabilitation. Elena Pickard fell and sustained a left femoral neck fracture. She underwent a left hip kenny arthroplasty per Dr. Alexus William DO on 7/2/2019. The patient's post operative course has been uneventful. Post op patient made WBAT LLE. Patient is being treated for recurrent UTI. Patient starting to stand, taking steps, however shows significant functional deficits due to surgical hip pain, decreased strength and endurance. Elena Pickard is seen and examined today. Medical Records reviewed.  At her baseline,she ambulates in the house independently using a walker.      Medical  Dx:  Past Medical History:   Diagnosis Date    Acute on Chronic UTI 9/10/2010    Allergic rhinitis     AMD (age-related macular degeneration), bilateral     ARF (acute renal failure) ([address] 12/13/2010    Arrhythmia     hx tachycardia     Ataxia     C. difficile colitis 12/17/2010    Calculus of kidney 2/20/2014    Cancer ([address]     neck skin ca 2 times    Chest pain     Cystocele, midline 2/20/2014    Depression     Falls frequently      last fall 10/11/13    GERD (gastroesophageal reflux disease)     takes OTC    Gross hematuria 2/20/2014    Headache     Hematuria, microscopic     Hypertension     controlled with meds     Hypomagnesemia 12/13/2010    Hypothyroidism     Incomplete bladder emptying 2/20/2014    Incontinence 4/10/2014    Kidney stones     Menopausal syndrome     Microscopic hematuria 2/20/2014    Mixed incontinence urge and stress (male)(female) 2/20/2014    Neurogenic bladder, NOS 2/20/2014    Neuropathy      lower legs    Obese     Osteoarthritis of left knee 3/29/2013    Other chronic cystitis 2/20/2014    Other musculoskeletal symptoms referable to limbs(729.89) 2/20/2014    Other nonspecific finding on examination of urine 2/20/2014    Pneumonia RLL infiltrate 12/17/2010    Polyneuropathy     Postmenopausal atrophic vaginitis 2/20/2014    Sepsis 9/10/2010    Splitting of urinary stream 2/20/2014    Stroke [facility])     [facility] - Dr. Yuly Trivedi Total knee replacement status 3/29/2013    Unspecified disorder of bladder 2/20/2014    Unspecified pyelonephritis 9/10/2010    Unspecified urinary incontinence 2/20/2014    S/p Anterior Elevate repair, trans obturator sling    Urge incontinence 2/20/2014    UTI (urinary tract infection) Enterobacter 12/17/2010        Current Level of Function: bed mobility - min A, transfers - min A, decreased balance , ambulation 3 < 10' with using a RW.    Prior Level of Function/Work/Activity:  Lives alone in single story home with 3 steps. Mod I in home with cane or walker. Family assists with transportation. Family History   Problem Relation Age of Onset    Heart Disease Father     Stroke Father     Heart Failure Father     Hypertension Father     Cancer Sister     Diabetes Brother     Cancer Sister     Thyroid Disease Sister     Diabetes Brother     Cancer Brother     Hypertension Other         brother, sister    Thyroid Disease Other         brother, sister    Breast Cancer Neg Hx       Social History     Tobacco Use    Smoking status: Never Smoker    Smokeless tobacco: Never Used   Substance Use Topics    Alcohol use: No     Past Surgical History:   Procedure Laterality Date    HX BACK SURGERY      HX COLONOSCOPY      HX HEART CATHETERIZATION  > 5 years ago    2 heart caths - no stents    HX HEENT      cyst removed from nose and gums    HX HYSTERECTOMY      with cyst on ovaries removed    HX KNEE ARTHROSCOPY      Left    HX MOHS PROCEDURES      Right X 2    HX ORTHOPAEDIC      left knee replacement    HX OTHER SURGICAL      rectocele repair    HX OTHER SURGICAL      Elevated cystocele repair    HX OTHER SURGICAL      neck surgury    HX OTHER SURGICAL      [place] Trans Obturator vaginal sling urethropexy ([facility])    HX OTHER SURGICAL      HNP    HX UROLOGICAL      3 bladder tacks    HX UROLOGICAL      2 benign tumors removed from bladder    HX UROLOGICAL      cystoscopy      Prior to Admission medications    Medication Sig Start Date End Date Taking? Authorizing Provider   venlafaxine-SR [facility]) 37.5 mg capsule Take 1 Cap by mouth daily. 6/17/19  Yes David Uribe MD   allopurinol (ZYLOPRIM) 300 mg tablet Take 1 Tab by mouth daily. 6/5/19  Yes David Uribe MD   lisinopril (PRINIVIL, ZESTRIL) 10 mg tablet TAKE ONE (1) TABLET BY MOUTH DAILY.  6/5/19  Yes David Uribe MD   levothyroxine (SYNTHROID) 75 mcg tablet Take 1 Tab by mouth Daily (before breakfast). 19  Yes Randell Katz MD   trimethoprim (TRIMPEX) 100 mg tablet Take 1 Tab by mouth two (2) times a day. 18  Yes Su Irby NP   oxybutynin chloride XL (DITROPAN XL) 10 mg CR tablet TAKE ONE (1) TABLET BY MOUTH DAILY. 18  Yes Su Irby NP   doxycycline (MONODOX) 100 mg capsule Take 1 Cap by mouth daily. 18  Yes Su Irby NP   MYRBETRIQ 50 mg ER tablet Take 1 Tab by mouth daily. Indications: URINARY URGE INCONTINENCE 18  Yes Su Irby NP   aspirin delayed-release 81 mg tablet Take 81 mg by mouth daily. Yes Provider, Historical   acetaminophen (TYLENOL) 650 mg CR tablet Take 500 mg by mouth every six (6) hours as needed for Pain. Provider, Historical     Allergies   Allergen Reactions    Adhesive Tape-Silicones Rash    Clarithromycin Rash    Lortab [Hydrocodone-Acetaminophen] Nausea and Vomiting    Other Medication Nausea and Vomiting     Antibiotic for abd infection- Prev-pack given for H. Pylori    Peanut Itching    Prednisone Nausea and Vomiting     Medrol dose pack    Prevacid [Lansoprazole] Nausea and Vomiting        Review of Systems: Denies chest pain, shortness of breath, cough, headache, visual problems, abdominal pain, dysurea, calf pain. Pertinent positives are as noted in the medical records and unremarkable otherwise. Objective:     Vitals:  Blood pressure 116/68, pulse (!) 118, temperature (!) 100.5 °F (38.1 °C), resp. rate 20, SpO2 97 %, not currently breastfeeding. Temp (24hrs), Av °F (37.2 °C), Min:97.2 °F (36.2 °C), Max:100.5 °F (38.1 °C)        Intake and Output:   1901 -  0700  In: 5410 [P.O.:120; I.V.:1725]  Out: 1197 [Urine:1275]    Physical Exam:   General: Alert and age appropriately oriented. No acute cardio respiratory distress. HEENT: Normocephalic,no scleral icterus  Oral mucosa moist without cyanosis   Lungs: Clear to auscultation  bilaterally.   Respiration even and unlabored   Heart: Regular rate and rhythm, S1, S2   No  murmurs, clicks, rub or gallops   Abdomen: Soft, non-tender, nondistended. Bowel sounds present. No organomegaly. Genitourinary: Benign . Neuromuscular:      Grossly no focal motor deficits noted. Moves ankles. No sensory deficits distally. Skin/extremity: No rashes, no erythema. Wound covered. Labs/Studies:  Recent Results (from the past 72 hour(s))   CBC WITH AUTOMATED DIFF    Collection Time: 07/02/19  3:24 PM   Result Value Ref Range    WBC 12.8 (H) 4.3 - 11.1 K/uL    RBC 3.66 (L) 4.05 - 5.2 M/uL    HGB 12.0 11.7 - 15.4 g/dL    HCT 35.6 (L) 35.8 - 46.3 %    MCV 97.3 79.6 - 97.8 FL    MCH 32.8 26.1 - 32.9 PG    MCHC 33.7 31.4 - 35.0 g/dL    RDW 13.6 11.9 - 14.6 %    PLATELET 334 858 - 840 K/uL    MPV 12.4 (H) 9.4 - 12.3 FL    ABSOLUTE NRBC 0.00 0.0 - 0.2 K/uL    DF AUTOMATED      NEUTROPHILS 89 (H) 43 - 78 %    LYMPHOCYTES 6 (L) 13 - 44 %    MONOCYTES 4 4.0 - 12.0 %    EOSINOPHILS 0 (L) 0.5 - 7.8 %    BASOPHILS 0 0.0 - 2.0 %    IMMATURE GRANULOCYTES 1 0.0 - 5.0 %    ABS. NEUTROPHILS 11.3 (H) 1.7 - 8.2 K/UL    ABS. LYMPHOCYTES 0.8 0.5 - 4.6 K/UL    ABS. MONOCYTES 0.6 0.1 - 1.3 K/UL    ABS. EOSINOPHILS 0.0 0.0 - 0.8 K/UL    ABS. BASOPHILS 0.0 0.0 - 0.2 K/UL    ABS. IMM.  GRANS. 0.1 0.0 - 0.5 K/UL   METABOLIC PANEL, BASIC    Collection Time: 07/02/19  3:24 PM   Result Value Ref Range    Sodium 139 136 - 145 mmol/L    Potassium 4.1 3.5 - 5.1 mmol/L    Chloride 107 98 - 107 mmol/L    CO2 21 21 - 32 mmol/L    Anion gap 11 7 - 16 mmol/L    Glucose 117 (H) 65 - 100 mg/dL    BUN 29 (H) 8 - 23 MG/DL    Creatinine 1.20 (H) 0.6 - 1.0 MG/DL    GFR est AA 56 (L) >60 ml/min/1.73m2    GFR est non-AA 46 (L) >60 ml/min/1.73m2    Calcium 9.7 8.3 - 10.4 MG/DL   PROTHROMBIN TIME + INR    Collection Time: 07/02/19  3:24 PM   Result Value Ref Range    Prothrombin time 14.0 11.7 - 14.5 sec    INR 1.1     EKG, 12 LEAD, INITIAL    Collection Time: 07/02/19  3:36 PM   Result Value Ref Range    Ventricular Rate 99 BPM    Atrial Rate 416 BPM    QRS Duration 94 ms    Q-T Interval 314 ms    QTC Calculation (Bezet) 402 ms    Calculated R Axis 9 degrees    Calculated T Axis 21 degrees    Diagnosis       !! AGE AND GENDER SPECIFIC ECG ANALYSIS !!  nsr with artifact  Nonspecific ST abnormality  Abnormal ECG  When compared with ECG of 05-NOV-2014 16:26,  Confirmed by Jatin Park MD (), KEY COLEMAN ([id number]) on 7/2/2019 5:13:29 PM     TYPE & SCREEN    Collection Time: 07/02/19  7:14 PM   Result Value Ref Range    Crossmatch Expiration 07/05/2019     ABO/Rh(D) Darroll Pelican POSITIVE     Antibody screen NEG    MSSA/MRSA SC BY PCR, NASAL SWAB    Collection Time: 07/02/19 10:23 PM   Result Value Ref Range    Special Requests: NO SPECIAL REQUESTS      Culture result:        SA target not detected. A MRSA NEGATIVE, SA NEGATIVE test result does not preclude MRSA or SA nasal colonization.    METABOLIC PANEL, BASIC    Collection Time: 07/03/19  6:34 AM   Result Value Ref Range    Sodium 139 136 - 145 mmol/L    Potassium 3.7 3.5 - 5.1 mmol/L    Chloride 111 (H) 98 - 107 mmol/L    CO2 19 (L) 21 - 32 mmol/L    Anion gap 9 7 - 16 mmol/L    Glucose 122 (H) 65 - 100 mg/dL    BUN 24 (H) 8 - 23 MG/DL    Creatinine 1.13 (H) 0.6 - 1.0 MG/DL    GFR est AA >60 >60 ml/min/1.73m2    GFR est non-AA 50 (L) >60 ml/min/1.73m2    Calcium 8.6 8.3 - 10.4 MG/DL   CBC WITH AUTOMATED DIFF    Collection Time: 07/03/19  6:34 AM   Result Value Ref Range    WBC 7.7 4.3 - 11.1 K/uL    RBC 3.20 (L) 4.05 - 5.2 M/uL    HGB 10.4 (L) 11.7 - 15.4 g/dL    HCT 30.7 (L) 35.8 - 46.3 %    MCV 95.9 79.6 - 97.8 FL    MCH 32.5 26.1 - 32.9 PG    MCHC 33.9 31.4 - 35.0 g/dL    RDW 13.4 11.9 - 14.6 %    PLATELET 582 (L) 337 - 450 K/uL    MPV 12.2 9.4 - 12.3 FL    ABSOLUTE NRBC 0.00 0.0 - 0.2 K/uL    DF AUTOMATED      NEUTROPHILS 74 43 - 78 %    LYMPHOCYTES 16 13 - 44 %    MONOCYTES 7 4.0 - 12.0 %    EOSINOPHILS 3 0.5 - 7.8 %    BASOPHILS 1 0.0 - 2.0 %    IMMATURE GRANULOCYTES 1 0.0 - 5.0 %    ABS. NEUTROPHILS 5.7 1.7 - 8.2 K/UL    ABS. LYMPHOCYTES 1.2 0.5 - 4.6 K/UL    ABS. MONOCYTES 0.5 0.1 - 1.3 K/UL    ABS. EOSINOPHILS 0.2 0.0 - 0.8 K/UL    ABS. BASOPHILS 0.0 0.0 - 0.2 K/UL    ABS. IMM. GRANS. 0.0 0.0 - 0.5 K/UL   URINALYSIS W/ RFLX MICROSCOPIC    Collection Time: 07/03/19 10:08 AM   Result Value Ref Range    Color YELLOW      Appearance HAZY      Specific gravity 1.025 (H) 1.001 - 1.023      pH (UA) 5.5 5.0 - 9.0      Protein NEGATIVE  NEG mg/dL    Glucose NEGATIVE  NEG mg/dL    Ketone NEGATIVE  NEG mg/dL    Bilirubin NEGATIVE  NEG      Blood SMALL (A) NEG      Urobilinogen 0.2 0.2 - 1.0 EU/dL    Nitrites NEGATIVE  NEG      Leukocyte Esterase NEGATIVE  NEG     CULTURE, URINE    Collection Time: 07/03/19 10:08 AM   Result Value Ref Range    Special Requests: NO SPECIAL REQUESTS      Culture result: NO GROWTH 1 DAY     URINE MICROSCOPIC    Collection Time: 07/03/19 10:08 AM   Result Value Ref Range    WBC 0-3 0 /hpf    RBC 0-3 0 /hpf    Epithelial cells 5-10 0 /hpf    Bacteria 0 0 /hpf    Casts 0 0 /lpf    Crystals, urine 0 0 /LPF    Mucus 0 0 /lpf    Other observations RESULTS VERIFIED MANUALLY     CBC WITH AUTOMATED DIFF    Collection Time: 07/03/19  5:25 PM   Result Value Ref Range    WBC 16.1 (H) 4.3 - 11.1 K/uL    RBC 3.20 (L) 4.05 - 5.2 M/uL    HGB 10.4 (L) 11.7 - 15.4 g/dL    HCT 31.2 (L) 35.8 - 46.3 %    MCV 97.5 79.6 - 97.8 FL    MCH 32.5 26.1 - 32.9 PG    MCHC 33.3 31.4 - 35.0 g/dL    RDW 13.5 11.9 - 14.6 %    PLATELET 397 (L) 370 - 450 K/uL    MPV 12.3 9.4 - 12.3 FL    ABSOLUTE NRBC 0.00 0.0 - 0.2 K/uL    DF AUTOMATED      NEUTROPHILS 88 (H) 43 - 78 %    LYMPHOCYTES 5 (L) 13 - 44 %    MONOCYTES 5 4.0 - 12.0 %    EOSINOPHILS 1 0.5 - 7.8 %    BASOPHILS 1 0.0 - 2.0 %    IMMATURE GRANULOCYTES 1 0.0 - 5.0 %    ABS.  NEUTROPHILS 14.2 (H) 1.7 - 8.2 K/UL    ABS. LYMPHOCYTES 0.8 0.5 - 4.6 K/UL    ABS. MONOCYTES 0.8 0.1 - 1.3 K/UL    ABS. EOSINOPHILS 0.2 0.0 - 0.8 K/UL    ABS. BASOPHILS 0.1 0.0 - 0.2 K/UL    ABS. IMM. GRANS. 0.1 0.0 - 0.5 K/UL   PLEASE READ & DOCUMENT PPD TEST IN 24 HRS    Collection Time: 07/03/19  6:32 PM   Result Value Ref Range    PPD Negative Negative    mm Induration 0 0 - 5 mm   HGB & HCT    Collection Time: 07/03/19  7:51 PM   Result Value Ref Range    HGB 10.5 (L) 11.7 - 15.4 g/dL    HCT 31.6 (L) 35.8 - 46.3 %   CBC WITH AUTOMATED DIFF    Collection Time: 07/04/19  4:50 AM   Result Value Ref Range    WBC 17.9 (H) 4.3 - 11.1 K/uL    RBC 3.04 (L) 4.05 - 5.2 M/uL    HGB 10.0 (L) 11.7 - 15.4 g/dL    HCT 29.6 (L) 35.8 - 46.3 %    MCV 97.4 79.6 - 97.8 FL    MCH 32.9 26.1 - 32.9 PG    MCHC 33.8 31.4 - 35.0 g/dL    RDW 13.6 11.9 - 14.6 %    PLATELET 920 305 - 547 K/uL    MPV 13.0 (H) 9.4 - 12.3 FL    ABSOLUTE NRBC 0.00 0.0 - 0.2 K/uL    DF AUTOMATED      NEUTROPHILS 87 (H) 43 - 78 %    LYMPHOCYTES 6 (L) 13 - 44 %    MONOCYTES 6 4.0 - 12.0 %    EOSINOPHILS 0 (L) 0.5 - 7.8 %    BASOPHILS 0 0.0 - 2.0 %    IMMATURE GRANULOCYTES 1 0.0 - 5.0 %    ABS. NEUTROPHILS 15.6 (H) 1.7 - 8.2 K/UL    ABS. LYMPHOCYTES 1.1 0.5 - 4.6 K/UL    ABS. MONOCYTES 1.0 0.1 - 1.3 K/UL    ABS. EOSINOPHILS 0.0 0.0 - 0.8 K/UL    ABS. BASOPHILS 0.1 0.0 - 0.2 K/UL    ABS. IMM.  GRANS. 0.1 0.0 - 0.5 K/UL   METABOLIC PANEL, BASIC    Collection Time: 07/04/19  4:50 AM   Result Value Ref Range    Sodium 137 136 - 145 mmol/L    Potassium 4.4 3.5 - 5.1 mmol/L    Chloride 107 98 - 107 mmol/L    CO2 21 21 - 32 mmol/L    Anion gap 9 7 - 16 mmol/L    Glucose 124 (H) 65 - 100 mg/dL    BUN 19 8 - 23 MG/DL    Creatinine 1.15 (H) 0.6 - 1.0 MG/DL    GFR est AA 59 (L) >60 ml/min/1.73m2    GFR est non-AA 49 (L) >60 ml/min/1.73m2    Calcium 8.9 8.3 - 10.4 MG/DL   MAGNESIUM    Collection Time: 07/04/19  4:50 AM   Result Value Ref Range    Magnesium 1.7 (L) 1.8 - 2.4 mg/dL       Functional Assessment:  Reviewed participation and progress in therapies  Gross Assessment  AROM: Generally decreased, functional (07/03/19 1609)  Strength: Generally decreased, functional (07/03/19 1609)  Coordination: Generally decreased, functional (07/03/19 1609)   Bed Mobility  Rolling: Total assistance;Assist x2 (07/04/19 0900)  Supine to Sit: Maximum assistance (07/03/19 1609)  Sit to Supine: Maximum assistance;Assist x2 (07/03/19 1609)  Scooting: Total assistance;Assist x2 (07/03/19 1609)   Balance  Sitting: Impaired (07/03/19 1609)  Sitting - Static: Poor (constant support) (07/03/19 1609)  Sitting - Dynamic: Poor (constant support) (07/03/19 1609)  Standing: Impaired (07/03/19 1609)  Standing - Static: Poor (07/03/19 1609)  Standing - Dynamic : Poor (07/03/19 1609)               Bed/Mat Mobility  Rolling: Total assistance;Assist x2 (07/04/19 0900)  Supine to Sit: Maximum assistance (07/03/19 1609)  Sit to Supine: Maximum assistance;Assist x2 (07/03/19 1609)  Sit to Stand: Maximum assistance;Assist x2 (07/03/19 1609)  Stand to Sit: Maximum assistance;Assist x2 (07/03/19 1609)  Bed to Chair: Total assistance (07/03/19 1609)  Scooting:  Total assistance;Assist x2 (07/03/19 1609)     Ambulation:       Impression/Plan:     Principal Problem:    Closed left hip fracture ([facility] [address] (7/2/2019)    Active Problems:    HTN (hypertension) (9/10/2010)      Hypothyroidism (9/10/2010)      Osteoarthritis of left knee (3/29/2013)      Neurogenic bladder (2/20/2014)      Incontinence (4/10/2014)      Depression ()      Recurrent UTI (4/25/2016)        Current Facility-Administered Medications   Medication Dose Route Frequency Provider Last Rate Last Dose    acetaminophen (TYLENOL) tablet 650 mg  650 mg Oral Q4H PRN Norma Mcdaniel NP   650 mg at 07/04/19 0831    trimethoprim (TRIMPEX) tablet 100 mg  100 mg Oral DAILY Norma Mcdaniel NP   100 mg at 07/04/19 0831    lactated Ringers infusion  100 mL/hr IntraVENous CONTINUOUS [place],  mL/hr at 07/04/19 0151 100 mL/hr at 07/04/19 0151    sodium chloride (NS) flush 5-40 mL  5-40 mL IntraVENous Q8H Per LEGER DO   10 mL at 07/04/19 3815    sodium chloride (NS) flush 5-40 mL  5-40 mL IntraVENous PRN [place], DO        alum-mag hydroxide-[facility]) oral suspension 30 mL  30 mL Oral Q4H PRN [place], DO        calcium-vitamin D (OS-JACKIE) 500 mg-200 unit tablet  1 Tab Oral TID WITH MEALS [place], DO   1 Tab at 07/04/19 0831    enoxaparin (LOVENOX) injection 40 mg  40 mg SubCUTAneous DAILY [place], DO   40 mg at 07/04/19 4798    Lactobacillus Acidoph & Bulgar CRESTWOOD [facility]) tablet 2 Tab  2 Tab Oral BID Flores Mcdaniel NP   2 Tab at 07/04/19 0831    ziprasidone (GEODON) 10 mg in sterile water (preservative free) 0.5 mL injection  10 mg IntraMUSCular Q12H PRN Flores Mcdaniel NP        levothyroxine (SYNTHROID) tablet 75 mcg  75 mcg Oral ACB Patty Guy MD   75 mcg at 07/04/19 0420    allopurinol (ZYLOPRIM) tablet 300 mg  300 mg Oral DAILY Patty Guy MD   300 mg at 07/04/19 0831    aspirin delayed-release tablet 81 mg  81 mg Oral DAILY Patty Guy MD   81 mg at 07/04/19 0831    venlafaxine-SR (EFFEXOR-XR) capsule 37.5 mg  37.5 mg Oral DAILY Patty Guy MD   37.5 mg at 07/04/19 0831    lisinopril (PRINIVIL, ZESTRIL) tablet 10 mg  10 mg Oral DAILY Patty Guy MD   10 mg at 07/04/19 0831    oxyCODONE IR (ROXICODONE) tablet 5 mg  5 mg Oral Q6H PRN Patty Guy MD   5 mg at 07/04/19 0422      Recommendations: Recommend sub acute rehab at [facility]. Plans discussed withfamily and they agree. Continue Acute Rehab Program.  Coordination of rehab/medical care. Will follow along with you for PM&R issues and provide you follow up. Will follow with SW/ /Admissions Coordinators regarding insurance approvals and acceptance.       Rehabilitation Management/ Medical Management: 1.Devices:Walkers, Type: Rolling Walker  2. Consult:Rehab team including PT, OT,  and . 3. Disposition Rehab-discussed with patient. 4. Plexipulse when in bed  5. Thigh-high or knee-high YOUNG's when out of bed  6. DVT Prophylaxis per ortho - on Lovenox. 7. Incentive spirometer Q1H while awake  8. Post op hemorrhagic anemia-monitor. 9. Activity: WBAT LLE  10. Planned Labs: CBC,BMP  11. Pain Control: stable, mild-to-moderate joint symptoms intermittently, reasonably well controlled by PRN meds   12. Wound Care: Keep wound clean and dry and Reinforce dressing PRN- remove staples 10-14 post surgery and apply benzoin and 1/2\" steristrips. Follow up with ORTHO per instructions. Thank you for the opportunity to participate in the care of this patient.   Signed By: Tyrell Knapp MD     July 4, 2019

## 2019-07-03 NOTE — ANESTHESIA POSTPROCEDURE EVALUATION
Procedure(s):  LEFT HIP HEMIARTHROPLASTY. spinal    Anesthesia Post Evaluation      Multimodal analgesia: multimodal analgesia not used between 6 hours prior to anesthesia start to PACU discharge  Patient location during evaluation: PACU  Patient participation: complete - patient participated  Level of consciousness: awake and alert  Pain management: adequate  Airway patency: patent  Anesthetic complications: no  Cardiovascular status: hemodynamically stable  Respiratory status: acceptable  Hydration status: acceptable        Vitals Value Taken Time   /71 7/3/2019  1:46 PM   Temp 37.1 °C (98.8 °F) 7/3/2019  1:03 PM   Pulse 69 7/3/2019  1:54 PM   Resp 16 7/3/2019  1:16 PM   SpO2 96 % 7/3/2019  1:54 PM   Vitals shown include unvalidated device data.

## 2019-07-03 NOTE — ANESTHESIA PREPROCEDURE EVALUATION
Relevant Problems   No relevant active problems       Anesthetic History   No history of anesthetic complications            Review of Systems / Medical History  Patient summary reviewed and pertinent labs reviewed    Pulmonary  Within defined limits                 Neuro/Psych       CVA  Headaches and dementia     Cardiovascular    Hypertension: well controlled        Dysrhythmias : SVT      Exercise tolerance: <4 METS  Comments: 4/2018 ECHO with EF 55-60% without valvular abnormalities     GI/Hepatic/Renal     GERD           Endo/Other      Hypothyroidism: well controlled  Morbid obesity     Other Findings              Physical Exam    Airway  Mallampati: II  TM Distance: 4 - 6 cm  Neck ROM: normal range of motion   Mouth opening: Normal     Cardiovascular    Rhythm: regular  Rate: abnormal         Dental    Dentition: Poor dentition     Pulmonary      Decreased breath sounds: bilateral           Abdominal  GI exam deferred       Other Findings            Anesthetic Plan    ASA: 3  Anesthesia type: spinal            Anesthetic plan and risks discussed with: Patient      Pt confused and unable to provide history. Chart review. Attempts to contact family were unsuccessful but the pt's nurse states that the daughter should be here tomorrow for surgery.

## 2019-07-03 NOTE — PROGRESS NOTES
PT note: Patient admitted with left hip fracture. Awaiting surgery today. Will hold evaluation and mobility until post op when new orders are placed. Thank you.      ELIZABETH CoradoT

## 2019-07-03 NOTE — PROGRESS NOTES
Met with patients daughter Hawk Doherty and Jared MADDOX regarding discharge planning. Patients remains in recovery room. Patient lives alone in own one story home with 3 step back entry and 6 step front entry both with railings. Prior to admission independent with ADL's. Ambulates with walker. Family provides all of her transportation. PCP- Dr. Whitley Hernandez and last seen 2 weeks ago and next follow up in on July 18th for new depression diagnosis and med dosing check (Zoloft). DME- walker, ARTURO,BSC,cane,SC  Patient has had previous [facility] services  Denies any problems affording Rx. CM discussed the need/benefits of STR at time of discharge. Family is agreeable to plan but stated patient is \"very stubborn and may not be agreeable. \"  List of STR facilities given to family to review and choose with patient. CM will continue to follow. Care Management Interventions  PCP Verified by CM:  Yes  Mode of Transport at Discharge: BLS  Transition of Care Consult (CM Consult): Discharge Planning  Physical Therapy Consult: Yes  Occupational Therapy Consult: Yes  Current Support Network: Own Home, Lives Alone  Confirm Follow Up Transport: Family  Plan discussed with Pt/Family/Caregiver: Yes  Freedom of Choice Offered: Yes  Discharge Location  Discharge Placement: Rehab Unit Subacute

## 2019-07-03 NOTE — PERIOP NOTES
TRANSFER - IN REPORT:    Verbal report received from [address] RN on Juventino Keith  being received from [phone] for routine progression of care      Report consisted of patients Situation, Background, Assessment and   Recommendations(SBAR). Information from the following report(s) SBAR was reviewed with the receiving nurse. Opportunity for questions and clarification was provided. Assessment to be completed upon patients arrival to unit and care to be assumed.

## 2019-07-03 NOTE — BRIEF OP NOTE
BRIEF OPERATIVE NOTE    Date of Procedure: 7/3/2019   Preoperative Diagnosis: Left femoral neck fracture  Postoperative Diagnosis: Left femoral neck fracture    Procedure(s):  LEFT HIP HEMIARTHROPLASTY  Surgeon(s) and Role:     Roman Rich DO - Primary         Surgical Assistant:     Surgical Staff:  Circ-1: Parker Cha RN  Scrub Tech-1: Cecy Rubio  Scrub Tech-2: Calixto Salmon Time In Time Out   Incision Start 07/03/2019 1148    Incision Close       Anesthesia: Spinal   Estimated Blood Loss: 400  Specimens:   ID Type Source Tests Collected by Time Destination   1 : LEFT FEMORAL HEAD AND NECK Fresh Bone  Cheyenne Dhillon DO 7/3/2019 1228 Pathology      Findings: fracture femoral neck left  Complications:  Implants:   Implant Name Type Inv.  Item Serial No.  Lot No. LRB No. Used Action   STEM FEM TAPR SZ7 HI OFFSET -- ACTIS - [id number]  STEM FEM TAPR SZ7 HI OFFSET -- ACTIS  [facility] [place] [facility] [id number] Left 1 Implanted   HEAD FEM EZ 28MM +1.5MM NK --  - [id number]  HEAD FEM EZ 28MM +1.5MM NK --   [facility] [place] [facility] [id number] Left 1 Implanted   HEAD FEM [facility] 52X28 SANA --  - [id number]  HEAD FEM [facility] 52X28 SANA --   [facility] [place] [employer] [id number] Left 1 Implanted

## 2019-07-03 NOTE — ANESTHESIA PROCEDURE NOTES
Spinal Block    Performed by: Janelle Mcintyre MD  Authorized by: Janelle Mcintyre MD     Pre-procedure:   Indications: primary anesthetic  Preanesthetic Checklist: patient identified, risks and benefits discussed, anesthesia consent, patient being monitored and timeout performed    Timeout Time: 11:20          Spinal Block:   Patient Position:  Seated  Prep Region:  Lumbar  Prep: Betadine      Location:  L3-4  Technique:  Single shot    Local Dose (mL):  3    Needle:   Needle Type:  Quincke  Needle Gauge:  25 G  Attempts:  1      Events: CSF confirmed, no blood with aspiration and no paresthesia        Assessment:  Insertion:  Uncomplicated  Patient tolerance:  Patient tolerated the procedure well with no immediate complications

## 2019-07-03 NOTE — PERIOP NOTES
1:58 PM  TRANSFER - OUT REPORT:    Verbal report given to [facility], RN(name) on Dana Morales  being transferred to 73(unit) for routine post - op       Report consisted of patients Situation, Background, Assessment and   Recommendations(SBAR). Information from the following report(s) SBAR, OR Summary, Procedure Summary, Intake/Output, MAR and Cardiac Rhythm NSR was reviewed with the receiving nurse. Lines:   Peripheral IV 07/03/19 Right Hand (Active)   Site Assessment Clean, dry, & intact 7/3/2019  1:03 PM   Phlebitis Assessment 0 7/3/2019  1:03 PM   Infiltration Assessment 0 7/3/2019  1:03 PM   Dressing Status Clean, dry, & intact 7/3/2019  1:03 PM   Dressing Type Tape;Transparent 7/3/2019  1:03 PM   Hub Color/Line Status Infusing 7/3/2019  1:03 PM   Alcohol Cap Used No 7/3/2019  1:03 PM       Peripheral IV 07/03/19 Right Antecubital (Active)   Site Assessment Clean, dry, & intact 7/3/2019  1:03 PM   Phlebitis Assessment 0 7/3/2019  1:03 PM   Infiltration Assessment 0 7/3/2019  1:03 PM   Dressing Status Clean, dry, & intact 7/3/2019  1:03 PM   Dressing Type Tape;Transparent 7/3/2019  1:03 PM   Hub Color/Line Status Capped 7/3/2019  1:03 PM   Alcohol Cap Used No 7/3/2019  1:03 PM        Opportunity for questions and clarification was provided. Patient transported with:  Oxygen at 3 L NC. VTE prophylaxis orders have been written for [facility].

## 2019-07-03 NOTE — PROGRESS NOTES
Hospitalist Progress Note    Subjective:   Daily Progress Note: 7/3/2019 1450  Patient with mechanical fall on to left hip 7/1 while cooking presented to ER 7/2 with progressively worsening pain to the hip, especially with attempts to ambulate. She has been walking since the incident with notable pain. Denies LOC, did strike head secondarily. No additional injury. Lives alone with daughter and son in law nearby. Cooks, cares for self. Found with left femoral neck fracture. Underwent left hip hemiarthroplasty today per Dr Krista Lyles under SAB. Tolerated well with stable VS, no nausea and good pain control. Daughter and son in law at bedside report recurrent, chronic UTIs under care of Urology. Last UTI 6/18 grew Klebsiella, treated with bactrim DS bid x 7days. Patient on trimpex 1 daily at baseline for suppression. This has been continued. Admission U/A with 5-10 WBC, culture is pending. She will receive ancef x 3 doses per surgical routine, hope to have preliminary urine culture back by then. Daughter also reports recent intermittent visual and auditory hallucinations for about  2 months, always worse with acute UTI. Has seen PHP for this, initially placed on zoloft with worsening symptoms, last week placed on low dose effexor, effects not noted as yet. Currently up in chair with some intermittent hallucinations. Has not been evaluated for dementia. Encouraged family to discuss with PHP after release from Rehab.      Current Facility-Administered Medications   Medication Dose Route Frequency    levothyroxine (SYNTHROID) tablet 75 mcg  75 mcg Oral ACB    allopurinol (ZYLOPRIM) tablet 300 mg  300 mg Oral DAILY    aspirin delayed-release tablet 81 mg  81 mg Oral DAILY    venlafaxine-SR (EFFEXOR-XR) capsule 37.5 mg  37.5 mg Oral DAILY    lisinopril (PRINIVIL, ZESTRIL) tablet 10 mg  10 mg Oral DAILY    0.9% sodium chloride infusion  100 mL/hr IntraVENous CONTINUOUS    sodium chloride (NS) flush 5-40 mL  5-40 mL IntraVENous Q8H    sodium chloride (NS) flush 5-40 mL  5-40 mL IntraVENous PRN    acetaminophen (TYLENOL) tablet 650 mg  650 mg Oral Q8H    tuberculin injection 5 Units  5 Units IntraDERMal ONCE    ondansetron (ZOFRAN) injection 4 mg  4 mg IntraVENous Q6H PRN    oxyCODONE IR (ROXICODONE) tablet 5 mg  5 mg Oral Q6H PRN    trimethoprim (TRIMPEX) tablet 100 mg  100 mg Oral DAILY        Review of Systems  Patient is unable at present    Objective:     Visit Vitals  /73 (BP 1 Location: Right arm, BP Patient Position: At rest)   Pulse 86   Temp 99.2 °F (37.3 °C)   Resp 18   SpO2 96%   Breastfeeding? No         Temp (24hrs), Av.8 °F (37.1 °C), Min:98.4 °F (36.9 °C), Max:99.2 °F (37.3 °C)     1901 -  0700  In: -   Out: 975 [Urine:975]    General appearance: Intermittently oriented and alert, then dozes back off. Reorients easily when necessary. Cooperative, pain controlled. Daughter and son in law at bedside. Obese. Head: Normocephalic, without obvious abnormality, atraumatic  Throat: Lips, mucosa, and tongue normal. Teeth and gums normal  Neck: supple, symmetrical, trachea midline, no JVD  Lungs: clear to auscultation bilaterally  Heart: Tachycardia, regular rate and rhythm, S1, S2 normal, no murmur, click, rub or gallop  Abdomen: soft, non-tender. Bowel sounds normal. No masses,  no organomegaly. Farah draining adequate amounts clear yellow urine. Extremities: Dry, intact dressing to left hip. All distal CMS intact.  All other extremities normal, atraumatic, no cyanosis or edema  Skin: Skin color pale, texture, turgor normal. No rashes or lesions  Neurologic: Grossly normal    Additional comments: Notes,orders, test results, vitals reviewed    Data Review  Recent Results (from the past 24 hour(s))   CBC WITH AUTOMATED DIFF    Collection Time: 19  3:24 PM   Result Value Ref Range    WBC 12.8 (H) 4.3 - 11.1 K/uL    RBC 3.66 (L) 4.05 - 5.2 M/uL    HGB 12.0 11.7 - 15.4 g/dL HCT 35.6 (L) 35.8 - 46.3 %    MCV 97.3 79.6 - 97.8 FL    MCH 32.8 26.1 - 32.9 PG    MCHC 33.7 31.4 - 35.0 g/dL    RDW 13.6 11.9 - 14.6 %    PLATELET 503 977 - 185 K/uL    MPV 12.4 (H) 9.4 - 12.3 FL    ABSOLUTE NRBC 0.00 0.0 - 0.2 K/uL    DF AUTOMATED      NEUTROPHILS 89 (H) 43 - 78 %    LYMPHOCYTES 6 (L) 13 - 44 %    MONOCYTES 4 4.0 - 12.0 %    EOSINOPHILS 0 (L) 0.5 - 7.8 %    BASOPHILS 0 0.0 - 2.0 %    IMMATURE GRANULOCYTES 1 0.0 - 5.0 %    ABS. NEUTROPHILS 11.3 (H) 1.7 - 8.2 K/UL    ABS. LYMPHOCYTES 0.8 0.5 - 4.6 K/UL    ABS. MONOCYTES 0.6 0.1 - 1.3 K/UL    ABS. EOSINOPHILS 0.0 0.0 - 0.8 K/UL    ABS. BASOPHILS 0.0 0.0 - 0.2 K/UL    ABS. IMM.  GRANS. 0.1 0.0 - 0.5 K/UL   METABOLIC PANEL, BASIC    Collection Time: 07/02/19  3:24 PM   Result Value Ref Range    Sodium 139 136 - 145 mmol/L    Potassium 4.1 3.5 - 5.1 mmol/L    Chloride 107 98 - 107 mmol/L    CO2 21 21 - 32 mmol/L    Anion gap 11 7 - 16 mmol/L    Glucose 117 (H) 65 - 100 mg/dL    BUN 29 (H) 8 - 23 MG/DL    Creatinine 1.20 (H) 0.6 - 1.0 MG/DL    GFR est AA 56 (L) >60 ml/min/1.73m2    GFR est non-AA 46 (L) >60 ml/min/1.73m2    Calcium 9.7 8.3 - 10.4 MG/DL   PROTHROMBIN TIME + INR    Collection Time: 07/02/19  3:24 PM   Result Value Ref Range    Prothrombin time 14.0 11.7 - 14.5 sec    INR 1.1     EKG, 12 LEAD, INITIAL    Collection Time: 07/02/19  3:36 PM   Result Value Ref Range    Ventricular Rate 99 BPM    Atrial Rate 416 BPM    QRS Duration 94 ms    Q-T Interval 314 ms    QTC Calculation (Bezet) 402 ms    Calculated R Axis 9 degrees    Calculated T Axis 21 degrees    Diagnosis       !! AGE AND GENDER SPECIFIC ECG ANALYSIS !!  nsr with artifact  Nonspecific ST abnormality  Abnormal ECG  When compared with ECG of 05-NOV-2014 16:26,  Confirmed by [facility]  MD (), KEY COLEMAN ([id number]) on 7/2/2019 5:13:29 PM     TYPE & SCREEN    Collection Time: 07/02/19  7:14 PM   Result Value Ref Range    Crossmatch Expiration 07/05/2019     ABO/Rh(D) Colton Jez POSITIVE Antibody screen NEG    MSSA/MRSA SC BY PCR, NASAL SWAB    Collection Time: 07/02/19 10:23 PM   Result Value Ref Range    Special Requests: NO SPECIAL REQUESTS      Culture result:        SA target not detected. A MRSA NEGATIVE, SA NEGATIVE test result does not preclude MRSA or SA nasal colonization. METABOLIC PANEL, BASIC    Collection Time: 07/03/19  6:34 AM   Result Value Ref Range    Sodium 139 136 - 145 mmol/L    Potassium 3.7 3.5 - 5.1 mmol/L    Chloride 111 (H) 98 - 107 mmol/L    CO2 19 (L) 21 - 32 mmol/L    Anion gap 9 7 - 16 mmol/L    Glucose 122 (H) 65 - 100 mg/dL    BUN 24 (H) 8 - 23 MG/DL    Creatinine 1.13 (H) 0.6 - 1.0 MG/DL    GFR est AA >60 >60 ml/min/1.73m2    GFR est non-AA 50 (L) >60 ml/min/1.73m2    Calcium 8.6 8.3 - 10.4 MG/DL   CBC WITH AUTOMATED DIFF    Collection Time: 07/03/19  6:34 AM   Result Value Ref Range    WBC 7.7 4.3 - 11.1 K/uL    RBC 3.20 (L) 4.05 - 5.2 M/uL    HGB 10.4 (L) 11.7 - 15.4 g/dL    HCT 30.7 (L) 35.8 - 46.3 %    MCV 95.9 79.6 - 97.8 FL    MCH 32.5 26.1 - 32.9 PG    MCHC 33.9 31.4 - 35.0 g/dL    RDW 13.4 11.9 - 14.6 %    PLATELET 926 (L) 374 - 450 K/uL    MPV 12.2 9.4 - 12.3 FL    ABSOLUTE NRBC 0.00 0.0 - 0.2 K/uL    DF AUTOMATED      NEUTROPHILS 74 43 - 78 %    LYMPHOCYTES 16 13 - 44 %    MONOCYTES 7 4.0 - 12.0 %    EOSINOPHILS 3 0.5 - 7.8 %    BASOPHILS 1 0.0 - 2.0 %    IMMATURE GRANULOCYTES 1 0.0 - 5.0 %    ABS. NEUTROPHILS 5.7 1.7 - 8.2 K/UL    ABS. LYMPHOCYTES 1.2 0.5 - 4.6 K/UL    ABS. MONOCYTES 0.5 0.1 - 1.3 K/UL    ABS. EOSINOPHILS 0.2 0.0 - 0.8 K/UL    ABS. BASOPHILS 0.0 0.0 - 0.2 K/UL    ABS. IMM. GRANS. 0.0 0.0 - 0.5 K/UL      7/2: LEFT HIP: Subcapital left hip fracture    7/2:  LEFT FEMUR:  There is a fracture at the junction of the femoral head and neck. A left knee prosthesis is present     7/2:  CXR: There is no consolidation, pleural effusions or visible pneumothorax.   Assessment/Plan:   Mechanical fall  Left femoral fracture at the juncture between head and neck   S/P Left hip hemiarthroplasty 7/3   Routine post op hip fracture orders   Plans for rehab on discharge, CM on board   Continue PT, OT interventions    Rehab MD in for consult 7/3  Intermittent visual and auditory hallucinations with approx 2 month history of same   Recently begun on effexor   Did not tolerate zoloft   Geodon 10 mg IM q 12 hours prn   Will need Gerontology testing post rehab  Urinary retention with recurrent UTI, neurogenic bladder   No history of ESBL   Most recent UTI 6/18 with Klebsiella: treated  with bactrim DS bid x 7 days     Add probiotics x 10 days   Taking trimpex daily for prophylaxis, continue for  Now   Urine culture pending   Will not add abx until culture resulted, continue  routine ancef 3 doses for now  HTN:  Continue home meds   Monitor  Post op pallor   Daily CBC   H/H at 2000 tonight   Hypothyroidism:  Continue home meds   Last TSH 1.090 5/19  Suspect CKD, creatine currently under baseline   Continue to monitor  Depression:  Recently placed on effexor  Debility   Caution for falls    Care Plan discussed with: Patient, daughter, son in law, and Nurse    Signed By: Chad Bryant NP     July 3, 2019

## 2019-07-04 LAB
ANION GAP SERPL CALC-SCNC: 9 MMOL/L (ref 7–16)
BASOPHILS # BLD: 0.1 K/UL (ref 0–0.2)
BASOPHILS NFR BLD: 0 % (ref 0–2)
BUN SERPL-MCNC: 19 MG/DL (ref 8–23)
CALCIUM SERPL-MCNC: 8.9 MG/DL (ref 8.3–10.4)
CHLORIDE SERPL-SCNC: 107 MMOL/L (ref 98–107)
CO2 SERPL-SCNC: 21 MMOL/L (ref 21–32)
CREAT SERPL-MCNC: 1.15 MG/DL (ref 0.6–1)
DIFFERENTIAL METHOD BLD: ABNORMAL
EOSINOPHIL # BLD: 0 K/UL (ref 0–0.8)
EOSINOPHIL NFR BLD: 0 % (ref 0.5–7.8)
ERYTHROCYTE [DISTWIDTH] IN BLOOD BY AUTOMATED COUNT: 13.6 % (ref 11.9–14.6)
GLUCOSE SERPL-MCNC: 124 MG/DL (ref 65–100)
HCT VFR BLD AUTO: 25 % (ref 35.8–46.3)
HCT VFR BLD AUTO: 29.6 % (ref 35.8–46.3)
HGB BLD-MCNC: 10 G/DL (ref 11.7–15.4)
HGB BLD-MCNC: 8.4 G/DL (ref 11.7–15.4)
IMM GRANULOCYTES # BLD AUTO: 0.1 K/UL (ref 0–0.5)
IMM GRANULOCYTES NFR BLD AUTO: 1 % (ref 0–5)
LYMPHOCYTES # BLD: 1.1 K/UL (ref 0.5–4.6)
LYMPHOCYTES NFR BLD: 6 % (ref 13–44)
MAGNESIUM SERPL-MCNC: 1.7 MG/DL (ref 1.8–2.4)
MCH RBC QN AUTO: 32.9 PG (ref 26.1–32.9)
MCHC RBC AUTO-ENTMCNC: 33.8 G/DL (ref 31.4–35)
MCV RBC AUTO: 97.4 FL (ref 79.6–97.8)
MM INDURATION POC: 0 MM (ref 0–5)
MONOCYTES # BLD: 1 K/UL (ref 0.1–1.3)
MONOCYTES NFR BLD: 6 % (ref 4–12)
NEUTS SEG # BLD: 15.6 K/UL (ref 1.7–8.2)
NEUTS SEG NFR BLD: 87 % (ref 43–78)
NRBC # BLD: 0 K/UL (ref 0–0.2)
PLATELET # BLD AUTO: 154 K/UL (ref 150–450)
PMV BLD AUTO: 13 FL (ref 9.4–12.3)
POTASSIUM SERPL-SCNC: 4.4 MMOL/L (ref 3.5–5.1)
PPD POC: NEGATIVE NEGATIVE
RBC # BLD AUTO: 3.04 M/UL (ref 4.05–5.2)
SODIUM SERPL-SCNC: 137 MMOL/L (ref 136–145)
WBC # BLD AUTO: 17.9 K/UL (ref 4.3–11.1)

## 2019-07-04 PROCEDURE — 65270000029 HC RM PRIVATE

## 2019-07-04 PROCEDURE — 97166 OT EVAL MOD COMPLEX 45 MIN: CPT

## 2019-07-04 PROCEDURE — 36430 TRANSFUSION BLD/BLD COMPNT: CPT

## 2019-07-04 PROCEDURE — 77030027138 HC INCENT SPIROMETER -A

## 2019-07-04 PROCEDURE — 74011250636 HC RX REV CODE- 250/636: Performed by: ORTHOPAEDIC SURGERY

## 2019-07-04 PROCEDURE — 74011250637 HC RX REV CODE- 250/637: Performed by: NURSE PRACTITIONER

## 2019-07-04 PROCEDURE — 74011250636 HC RX REV CODE- 250/636: Performed by: INTERNAL MEDICINE

## 2019-07-04 PROCEDURE — 83735 ASSAY OF MAGNESIUM: CPT

## 2019-07-04 PROCEDURE — 30233N1 TRANSFUSION OF NONAUTOLOGOUS RED BLOOD CELLS INTO PERIPHERAL VEIN, PERCUTANEOUS APPROACH: ICD-10-PCS | Performed by: INTERNAL MEDICINE

## 2019-07-04 PROCEDURE — 74011250637 HC RX REV CODE- 250/637: Performed by: INTERNAL MEDICINE

## 2019-07-04 PROCEDURE — 36415 COLL VENOUS BLD VENIPUNCTURE: CPT

## 2019-07-04 PROCEDURE — 85025 COMPLETE CBC W/AUTO DIFF WBC: CPT

## 2019-07-04 PROCEDURE — 77030020255 HC SOL INJ LR 1000ML BG

## 2019-07-04 PROCEDURE — 74011250636 HC RX REV CODE- 250/636: Performed by: NURSE PRACTITIONER

## 2019-07-04 PROCEDURE — 80048 BASIC METABOLIC PNL TOTAL CA: CPT

## 2019-07-04 PROCEDURE — P9016 RBC LEUKOCYTES REDUCED: HCPCS

## 2019-07-04 PROCEDURE — 97530 THERAPEUTIC ACTIVITIES: CPT

## 2019-07-04 PROCEDURE — 94760 N-INVAS EAR/PLS OXIMETRY 1: CPT

## 2019-07-04 PROCEDURE — 85018 HEMOGLOBIN: CPT

## 2019-07-04 PROCEDURE — 74011250637 HC RX REV CODE- 250/637: Performed by: ORTHOPAEDIC SURGERY

## 2019-07-04 PROCEDURE — 74011000258 HC RX REV CODE- 258: Performed by: ORTHOPAEDIC SURGERY

## 2019-07-04 RX ORDER — ACETAMINOPHEN 325 MG/1
650 TABLET ORAL
Status: DISCONTINUED | OUTPATIENT
Start: 2019-07-04 | End: 2019-07-06 | Stop reason: HOSPADM

## 2019-07-04 RX ORDER — LANOLIN ALCOHOL/MO/W.PET/CERES
400 CREAM (GRAM) TOPICAL DAILY
Status: DISCONTINUED | OUTPATIENT
Start: 2019-07-04 | End: 2019-07-06 | Stop reason: HOSPADM

## 2019-07-04 RX ORDER — SODIUM CHLORIDE 9 MG/ML
250 INJECTION, SOLUTION INTRAVENOUS AS NEEDED
Status: DISCONTINUED | OUTPATIENT
Start: 2019-07-04 | End: 2019-07-06 | Stop reason: HOSPADM

## 2019-07-04 RX ADMIN — LISINOPRIL 10 MG: 5 TABLET ORAL at 08:31

## 2019-07-04 RX ADMIN — VENLAFAXINE HYDROCHLORIDE 37.5 MG: 37.5 CAPSULE, EXTENDED RELEASE ORAL at 08:31

## 2019-07-04 RX ADMIN — ACETAMINOPHEN 650 MG: 325 TABLET, FILM COATED ORAL at 14:58

## 2019-07-04 RX ADMIN — TRIMETHOPRIM 100 MG: 100 TABLET ORAL at 08:31

## 2019-07-04 RX ADMIN — ACETAMINOPHEN 650 MG: 325 TABLET, FILM COATED ORAL at 08:31

## 2019-07-04 RX ADMIN — CALCIUM CARBONATE 500 MG (1,250 MG)-VITAMIN D3 200 UNIT TABLET 1 TABLET: at 08:31

## 2019-07-04 RX ADMIN — SODIUM CHLORIDE, SODIUM LACTATE, POTASSIUM CHLORIDE, AND CALCIUM CHLORIDE 500 ML: 600; 310; 30; 20 INJECTION, SOLUTION INTRAVENOUS at 12:42

## 2019-07-04 RX ADMIN — ENOXAPARIN SODIUM 40 MG: 40 INJECTION SUBCUTANEOUS at 08:32

## 2019-07-04 RX ADMIN — Medication 10 ML: at 04:32

## 2019-07-04 RX ADMIN — MAGNESIUM GLUCONATE 500 MG ORAL TABLET 400 MG: 500 TABLET ORAL at 14:53

## 2019-07-04 RX ADMIN — ASPIRIN 81 MG: 81 TABLET ORAL at 08:31

## 2019-07-04 RX ADMIN — SODIUM CHLORIDE, SODIUM LACTATE, POTASSIUM CHLORIDE, AND CALCIUM CHLORIDE 100 ML/HR: 600; 310; 30; 20 INJECTION, SOLUTION INTRAVENOUS at 12:26

## 2019-07-04 RX ADMIN — Medication 10 ML: at 14:53

## 2019-07-04 RX ADMIN — LEVOTHYROXINE SODIUM 75 MCG: 75 TABLET ORAL at 04:20

## 2019-07-04 RX ADMIN — OXYCODONE HYDROCHLORIDE 5 MG: 5 TABLET ORAL at 12:26

## 2019-07-04 RX ADMIN — SODIUM CHLORIDE, SODIUM LACTATE, POTASSIUM CHLORIDE, AND CALCIUM CHLORIDE 100 ML/HR: 600; 310; 30; 20 INJECTION, SOLUTION INTRAVENOUS at 01:51

## 2019-07-04 RX ADMIN — ALLOPURINOL 300 MG: 100 TABLET ORAL at 08:31

## 2019-07-04 RX ADMIN — LACTOBACILLUS TAB 2 TABLET: TAB at 08:31

## 2019-07-04 RX ADMIN — CALCIUM CARBONATE 500 MG (1,250 MG)-VITAMIN D3 200 UNIT TABLET 1 TABLET: at 18:02

## 2019-07-04 RX ADMIN — OXYCODONE HYDROCHLORIDE 5 MG: 5 TABLET ORAL at 18:02

## 2019-07-04 RX ADMIN — Medication 10 ML: at 21:28

## 2019-07-04 RX ADMIN — OXYCODONE HYDROCHLORIDE 5 MG: 5 TABLET ORAL at 04:22

## 2019-07-04 RX ADMIN — LACTOBACILLUS TAB 2 TABLET: TAB at 18:02

## 2019-07-04 RX ADMIN — SODIUM CHLORIDE 1000 MG: 900 INJECTION, SOLUTION INTRAVENOUS at 04:20

## 2019-07-04 RX ADMIN — SODIUM CHLORIDE 250 ML: 900 INJECTION, SOLUTION INTRAVENOUS at 21:30

## 2019-07-04 RX ADMIN — CALCIUM CARBONATE 500 MG (1,250 MG)-VITAMIN D3 200 UNIT TABLET 1 TABLET: at 12:26

## 2019-07-04 NOTE — PROGRESS NOTES
Accepted at [facility] for STR at time of discharge. Patient and family agreeable to facility. When medically cleared will proceed with transfer.

## 2019-07-04 NOTE — OP NOTES
[address] [facility]  OPERATIVE REPORT    Name:  Donell Mendez  MR#:  [id number]  :  1940  ACCOUNT #:  [de-identified]  DATE OF SERVICE:  2019    PREOPERATIVE DIAGNOSIS:  Left femoral neck fracture, displaced. POSTOPERATIVE DIAGNOSIS:  Left femoral neck fracture, displaced. PROCEDURE PERFORMED:  1. Left hip hemiarthroplasty with bipolar components using stem femoral taper, offset ACTIS stem #13 in size. 2.  A 1.5 mm x 28 mm head and a self-centering 52 mm bipolar implant. SURGEON:  Adenike Mccloud MD    ASSISTANT:  None. SURGICAL STAFF:  Magdalena Lopes as a circulator. SCRUB TECH:  [place] and Kayla Aguilar. ANESTHESIA:  Spinal.    COMPLICATIONS:      SPECIMENS REMOVED:  Femoral head and neck. IMPLANTS:  As above    ESTIMATED BLOOD LOSS:  400 mL. FINDINGS:  Fractured femoral neck, displaced. PROCEDURE:  The patient was placed in supine position and then placed in lateral position after spinal anesthetic preparation, draping, presentation of surgical field and stabilization with vacuum pillow was then performed. After time-out process and preparation and presentation of surgical field, a long lateral longitudinal incision was carried through subcutaneous tissues down to the tensor fascia lo centered at the greater trochanter. The fascia was then split the length of the incision. The anterior one third of the gluteus medius and minimus were then removed from its greater trochanteric attachment with cautery and the hip capsule identified and cut in a T-type or an H-type fashion to allow for capsular evaluation. The hip was then placed in a finger-of-four position and the femoral neck contoured utilizing oscillating saw. Fenestration with a box chisel in the intramedullary canal along with canal finder and reamer was then performed. Sequential broaching up to a #13 was then used and the wound was copiously irrigated.   Excellent fit and fill and implant stability was noted with a trial.  Wound was copiously irrigated with 1 liter of saline. The femoral head was then identified, evaluated and removed utilizing a corkscrew device and skipped the fovea centralis and ligamentum teres, carefully evaluated the ligamentum teres and removed with electrocautery. Copious irrigation again used once again. The femoral canal was then prepared utilizing irrigation and a rongeur. A final implant was then impacted into the intramedullary canal.  Trial head and bipolar implants were then used and noted to have excellent stability upon reduction. They were then re-dislocated and final implants impacted onto the clean implant stem. The wound was copiously irrigated with excellent stability noted of this final implant. Hip capsule was closed with #5 Ethibond. Gluteal musculature reattached through drill holes with #5 Ethibond to the greater trochanter, both medius and minimus separately. The wound was copiously irrigated. Fascia closed with #2 PDS. Skin closed with 0 Vicryl and staples. Aquacel dressing applied. The patient taken to the recovery room having tolerated the procedure quite nicely. Postoperatively the patient will require weightbearing as tolerated with activities and increased activities based on her rehabilitation process with anticipation of discharge within 3-4 days.         Maryuri Bazzi MD      [id number]  D:  07/03/2019 13:02  T:  07/03/2019 13:10  JOB #:  [id number]

## 2019-07-04 NOTE — PROGRESS NOTES
Problem: Mobility Impaired (Adult and Pediatric)  Goal: *Acute Goals and Plan of Care (Insert Text)  Description  LTG:  (1.)Ms. Dominic Tinsley will move from supine to sit and sit to supine , scoot up and down and roll side to side with MINIMAL ASSIST within 7 treatment day(s). (2.)Ms. Dominic Tinsley will transfer from bed to chair and chair to bed with MINIMAL ASSIST using the least restrictive device within 7 treatment day(s). (3.)Ms. Dominic Tinsley will ambulate with MODERATE ASSIST for 10 feet with the least restrictive device within 7 treatment day(s). (4.)Ms. Dominic Tinsley will perform exercises per HEP for 10+ minutes to improve strength and mobility within 7 days. ________________________________________________________________________________________________   Outcome: Progressing Towards Goal     PHYSICAL THERAPY: Daily Note and PM 7/4/2019  INPATIENT: PT Visit Days : 2  [place] / [place] / Product Type: Medicare /       NAME/AGE/GENDER: Lena Martin is a 69 y.o. female   PRIMARY DIAGNOSIS: Closed left hip fracture (HCC) [S72.002A] Closed left hip fracture (HCC)   Closed left hip fracture (HCC)    Procedure(s) (LRB):  LEFT HIP HEMIARTHROPLASTY (Left)  1 Day Post-Op  ICD-10: Treatment Diagnosis:    · Generalized Muscle Weakness (M62.81)  · Difficulty in walking, Not elsewhere classified (R26.2)  · Other abnormalities of gait and mobility (R26.89)  · History of falling (Z91.81)   Precaution/Allergies:  Adhesive tape-silicones; Clarithromycin; Lortab [hydrocodone-acetaminophen]; Other medication; Peanut; Prednisone; and Prevacid [lansoprazole]      ASSESSMENT:     Ms. Dominic Tinsley is a 69year old female admitted from home with left hip fracture after fall at home; she is s/p left hip hemiarthroplasty and is WBAT per ortho orders. At baseline pt lives at home alone and is ambulatory with cane or walker around home.  Does not drive- family lives locally and can assist.   She presents in chair from AM treatment. Performed several AP's. Still having trouble following commands and slow to respond to questions but a little better than this AM. Scooted to edge of chair andstood with max assist x 2 and max encouragement. Son at bedside and was very helpful. SPT with max assist x 2. Sit to supine with total assist x 2. Knee immobilizer replaced. Pt positioned for comfort. RN notified of pt request for pain medicine. Lartolu Citizen is functioning well below baseline with strength, mobility, balance, transfers, and activity tolerance and will benefit from continued therapy during hospital stay to address deficits/maximize independence with mobility. Will need rehab at discharge. This section established at most recent assessment   PROBLEM LIST (Impairments causing functional limitations):  1. Decreased Strength  2. Decreased ADL/Functional Activities  3. Decreased Transfer Abilities  4. Decreased Ambulation Ability/Technique  5. Decreased Balance  6. Increased Pain  7. Decreased Activity Tolerance  8. Decreased Knowledge of Precautions  9. Decreased Skin Integrity/Hygeine  10. Decreased Cognition   INTERVENTIONS PLANNED: (Benefits and precautions of physical therapy have been discussed with the patient.)  1. Balance Exercise  2. Bed Mobility  3. Gait Training  4. Home Exercise Program (HEP)  5. Therapeutic Activites  6. Therapeutic Exercise/Strengthening  7. Transfer Training     TREATMENT PLAN: Frequency/Duration: twice daily for duration of hospital stay  Rehabilitation Potential For Stated Goals: [facility] (at time of discharge pending progress):    Placement: It is my opinion, based on this patient's performance to date, that Ms. Maria Isabel Isaac may benefit from intensive therapy at a [address] [place] [address] after discharge due to the functional deficits listed above that are likely to improve with skilled rehabilitation and concerns that he/she may be unsafe to be unsupervised at home due to fall, weakness, limited mobility, post op pain, need for significant assist with mobility . Equipment:    Tbd pending progress               HISTORY:   History of Present Injury/Illness (Reason for Referral):  Per H&P, \"Mrs. Sindhu Yin is a nice 70 y/o WF with a h/o frequent UTIs on daily antibiotic suppression (TMP and doxycycline), hypothyroidism, CVA, HTN, MDD presenting s/p fall yesterday at home. She was in the kitchen cooking and was trying to \"move too quickly\" when she lost her balance and fell onto her left side. She noticed pain of the left hip but managed somewhat ok throughout yesterday, but pain persisted so she came to the ED today. Imaging shows left subcapital hip fracture. She denies chest pain, SOB, palpitations, dizziness or syncope at any point during her fall yesterday. Family is present and note that she has had frequent falls over the past several months, but has not had a fall in a few weeks. They thought it was neuropathy. She also recently saw Urology for another UTI, culture grew klebsiella pna sensitive to TMP (but resistant to doxy) which is what she was prescribed (in addition to daily doxycycline).  Hospitalist consulted for admission\"    Past Medical History/Comorbidities:   Ms. Sindhu Yin  has a past medical history of Acute on Chronic UTI (9/10/2010), Allergic rhinitis, AMD (age-related macular degeneration), bilateral, ARF (acute renal failure) ([facility] [address] (12/13/2010), Arrhythmia, Ataxia, C. difficile colitis (12/17/2010), Calculus of kidney (2/20/2014), Cancer ([address] Chest pain, Cystocele, midline (2/20/2014), Depression, Falls frequently, GERD (gastroesophageal reflux disease), Gross hematuria (2/20/2014), Headache, Hematuria, microscopic, Hypertension, Hypomagnesemia (12/13/2010), Hypothyroidism, Incomplete bladder emptying (2/20/2014), Incontinence (4/10/2014), Kidney stones, Menopausal syndrome, Microscopic hematuria (2/20/2014), Mixed incontinence urge and stress (male)(female) (2/20/2014), Neurogenic bladder, NOS (2/20/2014), Neuropathy, Obese, Osteoarthritis of left knee (3/29/2013), Other chronic cystitis (2/20/2014), Other musculoskeletal symptoms referable to limbs(729.89) (2/20/2014), Other nonspecific finding on examination of urine (2/20/2014), Pneumonia RLL infiltrate (12/17/2010), Polyneuropathy, Postmenopausal atrophic vaginitis (2/20/2014), Sepsis (9/10/2010), Splitting of urinary stream (2/20/2014), Stroke ([facility] [address] Total knee replacement status (3/29/2013), Unspecified disorder of bladder (2/20/2014), Unspecified pyelonephritis (9/10/2010), Unspecified urinary incontinence (2/20/2014), Urge incontinence (2/20/2014), and UTI (urinary tract infection) Enterobacter (12/17/2010). Ms. Hubert Richardson  has a past surgical history that includes hx heent; hx other surgical; hx heart catheterization (> 5 years ago); hx hysterectomy; hx knee arthroscopy; hx mohs procedure; hx orthopaedic; hx urological; hx urological; hx urological; hx other surgical; hx other surgical; hx other surgical; hx back surgery; hx colonoscopy; and hx other surgical.  Social History/Living Environment:   Home Environment: Private residence  # Steps to Enter: 3  Rails to Enter: Yes  Hand Rails : Bilateral  Wheelchair Ramp: No  One/Two Story Residence: One story  Living Alone: Yes  Support Systems: Child(cyndi), Family member(s)  Patient Expects to be Discharged to[de-identified] Rehabilitation facility  Current DME Used/Available at Home: Walker, rolling, Cane, straight, Shower chair, Commode, bedside  Prior Level of Function/Work/Activity:  Lives alone in single story home with 3 steps. Mod I in home with cane or walker. Family assists with transportation.       Number of Personal Factors/Comorbidities that affect the Plan of Care: 1-2: MODERATE COMPLEXITY   EXAMINATION:   Most Recent Physical Functioning:   Gross Assessment:                  Posture:     Balance:  Sitting: Impaired  Sitting - Static: Poor (constant support)  Sitting - Dynamic: Poor (constant support)  Standing - Static: Poor  Standing - Dynamic : Poor Bed Mobility:  Rolling: Total assistance;Assist x2  Wheelchair Mobility:     Transfers:  Sit to Stand: Maximum assistance;Assist x2  Stand to Sit: Maximum assistance;Assist x2  Gait:  Left Side Weight Bearing: As tolerated         Body Structures Involved:  1. Bones  2. Joints  3. Muscles Body Functions Affected:  1. Mental  2. Sensory/Pain  3. Movement Related  4. Skin Related Activities and Participation Affected:  1. General Tasks and Demands  2. Mobility  3. Domestic Life  4. Community, Social and [place]   Number of elements that affect the Plan of Care: 4+: HIGH COMPLEXITY   CLINICAL PRESENTATION:   Presentation: Stable and uncomplicated: LOW COMPLEXITY   CLINICAL DECISION MAKIN [facility] Mobility Inpatient Short Form  How much difficulty does the patient currently have. .. Unable A Lot A Little None   1. Turning over in bed (including adjusting bedclothes, sheets and blankets)? ? 1   ? 2   ? 3   ? 4   2. Sitting down on and standing up from a chair with arms ( e.g., wheelchair, bedside commode, etc.)   ? 1   ? 2   ? 3   ? 4   3. Moving from lying on back to sitting on the side of the bed?   ? 1   ? 2   ? 3   ? 4   How much help from another person does the patient currently need. .. Total A Lot A Little None   4. Moving to and from a bed to a chair (including a wheelchair)? ? 1   ? 2   ? 3   ? 4   5. Need to walk in hospital room? ? 1   ? 2   ? 3   ? 4   6. Climbing 3-5 steps with a railing? ? 1   ? 2   ? 3   ? 4   © , Trustees of [address] [place] [address] [address] [address], under license to [employer]. All rights reserved      Score:  Initial: 9 Most Recent: X (Date: -- )    Interpretation of Tool:  Represents activities that are increasingly more difficult (i.e. Bed mobility, Transfers, Gait).     Medical Necessity:     · Patient demonstrates good  ·  rehab potential due to higher previous functional level. Reason for Services/Other Comments:  · Patient continues to demonstrate capacity to improve strength, mobility, balance, transfers, activity tolerance which will increase independence, decrease amount of assistance required from caregiver and increase safety  · . Use of outcome tool(s) and clinical judgement create a POC that gives a: Clear prediction of patient's progress: LOW COMPLEXITY            TREATMENT:   (In addition to Assessment/Re-Assessment sessions the following treatments were rendered)   Pre-treatment Symptoms/Complaints:  \"Will you let me fall? \"  Pain: Initial:   Pain Intensity 1: 2  Post Session:  0/10     Therapeutic Activity: (   19 min ):  Therapeutic activities including Bed mobility (sit to supine transfers), seated balance and functional activities, sit-stand transfers, standing balance, Chair to bed transfer, improve mobility, strength, balance, coordination and activity tolerance . Required maximal assist of 1-2   to promote static and dynamic balance in standing and promote motor control of bilateral, lower extremity(s). Braces/Orthotics/Lines/Etc:   · O2 Device: Nasal cannula  Treatment/Session Assessment:    · Response to Treatment:  pt performs mobility with max-total assist of 1-2. Interdisciplinary Collaboration:   o Physical Therapy Assistant  o Registered Nurse  o Rehabilitation Attendant  · After treatment position/precautions:   o Up in chair  o Bed alarm/tab alert on  o Bed/Chair-wheels locked  o Bed in low position  o Call light within reach  o RN notified  o Family at bedside   · Compliance with Program/Exercises: Will assess as treatment progresses  · Recommendations/Intent for next treatment session: \"Next visit will focus on advancements to more challenging activities and reduction in assistance provided\".   Total Treatment Duration:  PT Patient Time In/Time Out  Time In: 1301  Time Out: Joel Chavez PTA

## 2019-07-04 NOTE — PROGRESS NOTES
7/4/2019  OT order received but per PT this am pt was very drowsy and not following many commands. Will re-attempt this afternoon if appropriate.   Thank you,  Samuel Singer, OT

## 2019-07-04 NOTE — PROGRESS NOTES
Verbal orders form Jorge Reeves NP, to give Tylenol 650 mg PO Q4hrs PRN for fever, mild pain, and headache.

## 2019-07-04 NOTE — PROGRESS NOTES
Problem: Self Care Deficits Care Plan (Adult)  Goal: *Acute Goals and Plan of Care (Insert Text)  Description  1. Patient will verbalize and demonstrate understanding of hip precautions with 100% accuracy during ADL. 2. Patient will complete functional transfers with moderate assistance and adaptive equipment as needed. 3. Patient will complete lower body bathing and dressing with moderate assistance and adaptive equipment as needed. 4. Patient will complete toileting with moderate assistance. 5. Patient will tolerate at least 15 minutes of BUE therapeutic exercises to increase strength in BUE to aid in functional transfers. 6. Patient will tolerate at least 25 minutes of OT treatment with 1-2 rest breaks breaks to increase activity tolerance for ADLs. Timeframe: 7 visits      Outcome: Progressing Towards Goal       OCCUPATIONAL THERAPY: Initial Assessment, Daily Note and PM 7/4/2019  INPATIENT: OT Visit Days: 1  [place] / [place] / Product Type: Medicare /    L LE WBAT; hip precautions  NAME/AGE/GENDER: Shun Mckeon is a 69 y.o. female   PRIMARY DIAGNOSIS:  Closed left hip fracture (HCC) [S72.002A] Closed left hip fracture (HCC)   Closed left hip fracture (HCC)    Procedure(s) (LRB):  LEFT HIP HEMIARTHROPLASTY (Left)  1 Day Post-Op  ICD-10: Treatment Diagnosis:    Stiffness of Right Shoulder, Not elsewhere classified (M25.611)  Pain in left hip (M25.552)  Stiffness of Left Hip, Not elsewhere classified (M25.652)  Generalized Muscle Weakness (M62.81)  Other lack of cordination (R27.8)  Repeated Falls (R29.6)   Precautions/Allergies:     Adhesive tape-silicones; Clarithromycin; Lortab [hydrocodone-acetaminophen]; Other medication; Peanut; Prednisone; and Prevacid [lansoprazole]      ASSESSMENT:     Ms. Hubert Richardson was admitted with close left hip fracture and is s/p L hemiarthroplasty.  Pt lives alone in and is  typically modified independent with ADL/functional mobility at baseline. This session, pt presented supine in the bed with family members at bedside. Pt is confused and drowsy. Pt demonstrated deficits in cognition, pain, strength, activity tolerance, and balance impacting ADLs. Pt transferred to the edge of the bed with total assistance x 2. Pt leans posteriorly with pt working on improving her posture and balance edge of bed. Pt does not follow commands for MMT. Pt also will not comb her hair with attempts to place comb in her R dominant hand. Pt c/o of R shoulder pain and family reports that she has hx of RC surgeries. Family is supportive but very over-stimulating and giving her multiple commands at the same time. Pt was able to stand but with maximal assistance x 2-3 and leaning posteriorly with inability to stand with upright posture. At the end of the session, pt was returned to supine with needs in reach. Sascha Angers is in place and patient were educated on hip precautions but with need reinforcement of education. Pt presented below functional baseline and would benefit from skilled OT services to address deficits.        This section established at most recent assessment   PROBLEM LIST (Impairments causing functional limitations):  Decreased Strength  Decreased ADL/Functional Activities  Decreased Transfer Abilities  Decreased Ambulation Ability/Technique  Decreased Balance  Increased Pain  Decreased Activity Tolerance  Increased Fatigue  Increased Shortness of Breath  Decreased Flexibility/Joint Mobility  Edema/Girth  Decreased Knowledge of Precautions  Decreased Skin Integrity/Hygeine  Decreased [place] with Home Exercise Program  Decreased Cognition   INTERVENTIONS PLANNED: (Benefits and precautions of occupational therapy have been discussed with the patient.)  Activities of daily living training  Adaptive equipment training  Balance training  Clothing management  Cognitive training  Donning&doffing training  Neuromuscular re-eduation  Therapeutic activity  Therapeutic exercise     TREATMENT PLAN: Frequency/Duration: Follow patient 6 times per week to address above goals. Rehabilitation Potential For Stated Goals: Good     REHAB RECOMMENDATIONS (at time of discharge pending progress):    Placement: It is my opinion, based on this patient's performance to date, that Ms. Hubert Richardson may benefit from intensive therapy at a [address] [place] [address] after discharge due to the functional deficits listed above that are likely to improve with skilled rehabilitation and concerns that he/she may be unsafe to be unsupervised at home due to risk for falls and further functional decline .   Equipment:   TBD               OCCUPATIONAL PROFILE AND HISTORY:   History of Present Injury/Illness (Reason for Referral):  See H&P  Past Medical History/Comorbidities:   Ms. Hubert Richardson  has a past medical history of Acute on Chronic UTI (9/10/2010), Allergic rhinitis, AMD (age-related macular degeneration), bilateral, ARF (acute renal failure) ([facility] [address] (12/13/2010), Arrhythmia, Ataxia, C. difficile colitis (12/17/2010), Calculus of kidney (2/20/2014), Cancer ([facility] [address] Chest pain, Cystocele, midline (2/20/2014), Depression, Falls frequently, GERD (gastroesophageal reflux disease), Gross hematuria (2/20/2014), Headache, Hematuria, microscopic, Hypertension, Hypomagnesemia (12/13/2010), Hypothyroidism, Incomplete bladder emptying (2/20/2014), Incontinence (4/10/2014), Kidney stones, Menopausal syndrome, Microscopic hematuria (2/20/2014), Mixed incontinence urge and stress (male)(female) (2/20/2014), Neurogenic bladder, NOS (2/20/2014), Neuropathy, Obese, Osteoarthritis of left knee (3/29/2013), Other chronic cystitis (2/20/2014), Other musculoskeletal symptoms referable to limbs(729.89) (2/20/2014), Other nonspecific finding on examination of urine (2/20/2014), Pneumonia RLL infiltrate (12/17/2010), Polyneuropathy, Postmenopausal atrophic vaginitis (2/20/2014), Sepsis (9/10/2010), Splitting of urinary stream (2/20/2014), Stroke [facility]), Total knee replacement status (3/29/2013), Unspecified disorder of bladder (2/20/2014), Unspecified pyelonephritis (9/10/2010), Unspecified urinary incontinence (2/20/2014), Urge incontinence (2/20/2014), and UTI (urinary tract infection) Enterobacter (12/17/2010). Ms. Brett Diaz  has a past surgical history that includes hx heent; hx other surgical; hx heart catheterization (> 5 years ago); hx hysterectomy; hx knee arthroscopy; hx mohs procedure; hx orthopaedic; hx urological; hx urological; hx urological; hx other surgical; hx other surgical; hx other surgical; hx back surgery; hx colonoscopy; and hx other surgical.  Social History/Living Environment:   Home Environment: Private residence  # Steps to Enter: 3  Rails to Enter: Yes  Hand Rails : Bilateral  Wheelchair Ramp: No  One/Two Story Residence: One story  Living Alone: Yes  Support Systems: Child(cyndi), Family member(s)  Patient Expects to be Discharged to[de-identified] Rehabilitation facility  Current DME Used/Available at Home: Walker, rolling, Cane, straight, Shower chair, Commode, bedside  Prior Level of Function/Work/Activity:  Pt lives alone in and is  typically modified independent with ADL/functional mobility at baseline.    Personal Factors:          Social Background:  lives alone        Past/Current Experience:  frequent falls        Overall Behavior:  confused, drowsy, decreased command following        Other factors that influence how disability is experienced by the patient:   multiple co-morbidities     Number of Personal Factors/Comorbidities that affect the Plan of Care: Extensive review of physical, cognitive, and psychosocial performance (3+):  HIGH COMPLEXITY   ASSESSMENT OF OCCUPATIONAL PERFORMANCE[de-identified]   Activities of Daily Living:   Basic ADLs (From Assessment) Complex ADLs (From Assessment)   Feeding: Minimum assistance  Oral Facial Hygiene/Grooming: Maximum assistance  Bathing: Maximum assistance  Upper Body Dressing: Maximum assistance  Lower Body Dressing: Total assistance  Toileting: Total assistance Instrumental ADL  Meal Preparation: Total assistance  Homemaking: Total assistance   Grooming/Bathing/Dressing Activities of Daily Living     Cognitive Retraining  Safety/Judgement: Decreased insight into deficits; Fall prevention;Home safety                       Bed/Mat Mobility  Rolling: Total assistance;Assist x2  Supine to Sit: Total assistance;Assist x2  Sit to Supine: Total assistance;Assist x2  Sit to Stand: Maximum assistance;Assist x2  Stand to Sit: Maximum assistance;Assist x2     Most Recent Physical Functioning:   Gross Assessment:  AROM: Generally decreased, functional  Strength: Generally decreased, functional  Coordination: Generally decreased, functional               Posture:  Posture (WDL): Exceptions to WDL  Posture Assessment: Forward head, Rounded shoulders  Balance:  Sitting: Impaired  Sitting - Static: Poor (constant support)  Sitting - Dynamic: Poor (constant support)  Standing: Impaired  Standing - Static: Poor  Standing - Dynamic : Poor Bed Mobility:  Rolling: Total assistance;Assist x2  Supine to Sit: Total assistance;Assist x2  Sit to Supine:  Total assistance;Assist x2  Wheelchair Mobility:     Transfers:  Sit to Stand: Maximum assistance;Assist x2  Stand to Sit: Maximum assistance;Assist x2            Patient Vitals for the past 6 hrs:   BP BP Patient Position SpO2 Pulse   07/04/19 1240 (!) 88/54 At rest 95 % (!) 107   07/04/19 1425 92/52 -- -- --   07/04/19 1530 106/63 At rest 97 % 87       Mental Status  Neurologic State: Appropriate for age, Drowsy  Orientation Level: Oriented to person  Cognition: Decreased attention/concentration, Decreased command following, Impaired decision making  Perception: Cues to maintain midline in sitting, Cues to maintain midline in standing  Safety/Judgement: Decreased insight into deficits, Fall prevention, Home safety                          Physical Skills Involved:  Range of Motion  Balance  Strength  Activity Tolerance  Pain (acute) Cognitive Skills Affected (resulting in the inability to perform in a timely and safe manner):  Perception  Executive Function  Immediate Memory  Short Term Recall  Sustained Attention  Divided Attention  Comprehension Psychosocial Skills Affected:  Habits/Routines  Environmental Adaptation  Self-Awareness   Number of elements that affect the Plan of Care: 5+:  HIGH COMPLEXITY   CLINICAL DECISION MAKIN [facility] [address]? ?6 Clicks? Daily Activity Inpatient Short Form  How much help from another person does the patient currently need. .. Total A Lot A Little None   1. Putting on and taking off regular lower body clothing? ? 1   ? 2   ? 3   ? 4   2. Bathing (including washing, rinsing, drying)? ? 1   ? 2   ? 3   ? 4   3. Toileting, which includes using toilet, bedpan or urinal?   ? 1   ? 2   ? 3   ? 4   4. Putting on and taking off regular upper body clothing? ? 1   ? 2   ? 3   ? 4   5. Taking care of personal grooming such as brushing teeth? ? 1   ? 2   ? 3   ? 4   6. Eating meals? ? 1   ? 2   ? 3   ? 4   © 2007, [employer] [place] [address], under license to [employer]. All rights reserved      Score:  Initial: 11 Most Recent: X (Date: -- )    Interpretation of Tool:  Represents activities that are increasingly more difficult (i.e. Bed mobility, Transfers, Gait). Medical Necessity:     Patient demonstrates good   rehab potential due to higher previous functional level. Reason for Services/Other Comments:  Patient continues to require skilled intervention due to decreased independence with ADL/functional transfers   .    Use of outcome tool(s) and clinical judgement create a POC that gives a: MODERATE COMPLEXITY         TREATMENT:   (In addition to Assessment/Re-Assessment sessions the following treatments were rendered)     Pre-treatment Symptoms/Complaints:    Pain: Initial:   Pain Intensity 1: 5  Pain Location 1: Hip  Pain Orientation 1: Left  Pain Intervention(s) 1: Repositioned  Post Session:  same     Therapeutic Activity: (    23 minutes): Therapeutic activities including Bed transfers, sitting balance/posture edge of bed, and sit to stand x 1 with cues for balance/posture  to improve mobility, strength, balance and coordination. Required maximal assistance x 2-3   to promote static balance in standing. N/a     Braces/Orthotics/Lines/Etc:   IV  O2 Device: Nasal cannula  Treatment/Session Assessment:    Response to Treatment:  Pt tolerated with pain and decreased command following. Interdisciplinary Collaboration:   Occupational Therapist  Registered Nurse  Rehabilitation Attendant  After treatment position/precautions:   Supine in bed  Bed alarm/tab alert on  Bed/Chair-wheels locked  Call light within reach  RN notified  Family at bedside   Compliance with Program/Exercises: Will assess as treatment progresses. Recommendations/Intent for next treatment session: \"Next visit will focus on advancements to more challenging activities and reduction in assistance provided\".   Total Treatment Duration:  OT Patient Time In/Time Out  Time In: 1789  Time Out: [address] [facility]

## 2019-07-04 NOTE — PROGRESS NOTES
ORTH FRACTURE PROGRESS NOTE    2019  Admit Date:   2019    Post Op day: 1 Day Post-Op    Subjective:    Maile Chandler appears lethargic, disoriented  Son states this happens occasionally with episodes of hallucinations which were being evaluated by primary care prior to admission    PT/OT:   Gait:                    Vital Signs:    Patient Vitals for the past 8 hrs:   BP Temp Pulse Resp SpO2   19 0750 116/68 (!) 100.5 °F (38.1 °C) (!) 118 20 97 %   19 0419 116/74 100.1 °F (37.8 °C) 92 20 96 %     Temp (24hrs), Av °F (37.2 °C), Min:97.2 °F (36.2 °C), Max:100.5 °F (38.1 °C)      Pain Control:   Pain Assessment  Pain Scale 1: FLACC  Pain Intensity 1: 2  Pain Onset 1: post op  Pain Location 1: Hip  Pain Orientation 1: Left  Pain Description 1: Aching  Pain Intervention(s) 1: Medication (see MAR)    Meds:    Current Facility-Administered Medications   Medication Dose Route Frequency    acetaminophen (TYLENOL) tablet 650 mg  650 mg Oral Q4H PRN    trimethoprim (TRIMPEX) tablet 100 mg  100 mg Oral DAILY    lactated Ringers infusion  100 mL/hr IntraVENous CONTINUOUS    sodium chloride (NS) flush 5-40 mL  5-40 mL IntraVENous Q8H    sodium chloride (NS) flush 5-40 mL  5-40 mL IntraVENous PRN    alum-mag hydroxide-simeth (MYLANTA) oral suspension 30 mL  30 mL Oral Q4H PRN    calcium-vitamin D (OS-JACKIE) 500 mg-200 unit tablet  1 Tab Oral TID WITH MEALS    enoxaparin (LOVENOX) injection 40 mg  40 mg SubCUTAneous DAILY    Lactobacillus Acidoph & Bulgar (FLORANEX) tablet 2 Tab  2 Tab Oral BID    ziprasidone (GEODON) 10 mg in sterile water (preservative free) 0.5 mL injection  10 mg IntraMUSCular Q12H PRN    levothyroxine (SYNTHROID) tablet 75 mcg  75 mcg Oral ACB    allopurinol (ZYLOPRIM) tablet 300 mg  300 mg Oral DAILY    aspirin delayed-release tablet 81 mg  81 mg Oral DAILY    venlafaxine-SR (EFFEXOR-XR) capsule 37.5 mg  37.5 mg Oral DAILY    lisinopril (PRINIVIL, ZESTRIL) tablet 10 mg  10 mg Oral DAILY    oxyCODONE IR (ROXICODONE) tablet 5 mg  5 mg Oral Q6H PRN       LAB:    Recent Labs     07/04/19  0450  07/02/19  1524   HCT 29.6*   < > 35.6*   HGB 10.0*   < > 12.0   INR  --   --  1.1    < > = values in this interval not displayed.        24 Hour Assessment Issues:    Disoriented (new onset)    Discharge Planning: SNF    Transfuse PRBC's:      Assessment & Physician's Comment:  Dressing is clean, dry, and intact    Principal Problem:    Closed left hip fracture ([facility] [address] (7/2/2019)    Active Problems:    HTN (hypertension) (9/10/2010)      Hypothyroidism (9/10/2010)      Osteoarthritis of left knee (3/29/2013)      Neurogenic bladder (2/20/2014)      Incontinence (4/10/2014)      Depression ()      Recurrent UTI (4/25/2016)        Plan: Continue rehab  Monitor mental status      Mason Clarke,

## 2019-07-04 NOTE — PROGRESS NOTES
Problem: Mobility Impaired (Adult and Pediatric)  Goal: *Acute Goals and Plan of Care (Insert Text)  Description  LTG:  (1.)Ms. Ramon Arrington will move from supine to sit and sit to supine , scoot up and down and roll side to side with MINIMAL ASSIST within 7 treatment day(s). (2.)Ms. Ramon Arrington will transfer from bed to chair and chair to bed with MINIMAL ASSIST using the least restrictive device within 7 treatment day(s). (3.)Ms. Ramon Arrington will ambulate with MODERATE ASSIST for 10 feet with the least restrictive device within 7 treatment day(s). (4.)Ms. Ramon Arrington will perform exercises per HEP for 10+ minutes to improve strength and mobility within 7 days. ________________________________________________________________________________________________   Outcome: Progressing Towards Goal     PHYSICAL THERAPY: Daily Note and AM 7/4/2019  INPATIENT: PT Visit Days : 2  [place] / [place] / Product Type: Medicare /       NAME/AGE/GENDER: Viviana Rodriguez is a 69 y.o. female   PRIMARY DIAGNOSIS: Closed left hip fracture (HCC) [S72.002A] Closed left hip fracture (HCC)   Closed left hip fracture (HCC)    Procedure(s) (LRB):  LEFT HIP HEMIARTHROPLASTY (Left)  1 Day Post-Op  ICD-10: Treatment Diagnosis:    · Generalized Muscle Weakness (M62.81)  · Difficulty in walking, Not elsewhere classified (R26.2)  · Other abnormalities of gait and mobility (R26.89)  · History of falling (Z91.81)   Precaution/Allergies:  Adhesive tape-silicones; Clarithromycin; Lortab [hydrocodone-acetaminophen]; Other medication; Peanut; Prednisone; and Prevacid [lansoprazole]      ASSESSMENT:     Ms. Ramon Arrington is a 69year old female admitted from home with left hip fracture after fall at home; she is s/p left hip hemiarthroplasty and is WBAT per ortho orders. At baseline pt lives at home alone and is ambulatory with cane or walker around home.  Does not drive- family lives locally and can assist.   She presents in supine on contact. Pt unable to follow commands to perform AP's. Slow to respond to questions. Worked on bed mobility to get sheet under her. Performed sheet transfer from bed to chair with total assist. Positioned comfortably with LEs elevated, chair alarm on, and KI donned. Son at bedside. RN notified of pt request for pain medicine. Elo Mcwilliams is functioning well below baseline with strength, mobility, balance, transfers, and activity tolerance and will benefit from continued therapy during hospital stay to address deficits/maximize independence with mobility. Will need rehab at discharge. This section established at most recent assessment   PROBLEM LIST (Impairments causing functional limitations):  1. Decreased Strength  2. Decreased ADL/Functional Activities  3. Decreased Transfer Abilities  4. Decreased Ambulation Ability/Technique  5. Decreased Balance  6. Increased Pain  7. Decreased Activity Tolerance  8. Decreased Knowledge of Precautions  9. Decreased Skin Integrity/Hygeine  10. Decreased Cognition   INTERVENTIONS PLANNED: (Benefits and precautions of physical therapy have been discussed with the patient.)  1. Balance Exercise  2. Bed Mobility  3. Gait Training  4. Home Exercise Program (HEP)  5. Therapeutic Activites  6. Therapeutic Exercise/Strengthening  7. Transfer Training     TREATMENT PLAN: Frequency/Duration: twice daily for duration of hospital stay  Rehabilitation Potential For Stated Goals: [facility] (at time of discharge pending progress):    Placement: It is my opinion, based on this patient's performance to date, that Ms. Magdalena Rayo may benefit from intensive therapy at a [address] [place] [address] after discharge due to the functional deficits listed above that are likely to improve with skilled rehabilitation and concerns that he/she may be unsafe to be unsupervised at home due to fall, weakness, limited mobility, post op pain, need for significant assist with mobility . Equipment:    Tbd pending progress               HISTORY:   History of Present Injury/Illness (Reason for Referral):  Per H&P, \"Mrs. Aziza Newton is a nice 68 y/o WF with a h/o frequent UTIs on daily antibiotic suppression (TMP and doxycycline), hypothyroidism, CVA, HTN, MDD presenting s/p fall yesterday at home. She was in the kitchen cooking and was trying to \"move too quickly\" when she lost her balance and fell onto her left side. She noticed pain of the left hip but managed somewhat ok throughout yesterday, but pain persisted so she came to the ED today. Imaging shows left subcapital hip fracture. She denies chest pain, SOB, palpitations, dizziness or syncope at any point during her fall yesterday. Family is present and note that she has had frequent falls over the past several months, but has not had a fall in a few weeks. They thought it was neuropathy. She also recently saw Urology for another UTI, culture grew klebsiella pna sensitive to TMP (but resistant to doxy) which is what she was prescribed (in addition to daily doxycycline).  Hospitalist consulted for admission\"    Past Medical History/Comorbidities:   Ms. Aziza Newton  has a past medical history of Acute on Chronic UTI (9/10/2010), Allergic rhinitis, AMD (age-related macular degeneration), bilateral, ARF (acute renal failure) ([address] (12/13/2010), Arrhythmia, Ataxia, C. difficile colitis (12/17/2010), Calculus of kidney (2/20/2014), Cancer ([address] Chest pain, Cystocele, midline (2/20/2014), Depression, Falls frequently, GERD (gastroesophageal reflux disease), Gross hematuria (2/20/2014), Headache, Hematuria, microscopic, Hypertension, Hypomagnesemia (12/13/2010), Hypothyroidism, Incomplete bladder emptying (2/20/2014), Incontinence (4/10/2014), Kidney stones, Menopausal syndrome, Microscopic hematuria (2/20/2014), Mixed incontinence urge and stress (male)(female) (2/20/2014), Neurogenic bladder, NOS (2/20/2014), Neuropathy, Obese, Osteoarthritis of left knee (3/29/2013), Other chronic cystitis (2/20/2014), Other musculoskeletal symptoms referable to limbs(729.89) (2/20/2014), Other nonspecific finding on examination of urine (2/20/2014), Pneumonia RLL infiltrate (12/17/2010), Polyneuropathy, Postmenopausal atrophic vaginitis (2/20/2014), Sepsis (9/10/2010), Splitting of urinary stream (2/20/2014), Stroke ([facility] Total knee replacement status (3/29/2013), Unspecified disorder of bladder (2/20/2014), Unspecified pyelonephritis (9/10/2010), Unspecified urinary incontinence (2/20/2014), Urge incontinence (2/20/2014), and UTI (urinary tract infection) Enterobacter (12/17/2010). Ms. Marco Mcintyre  has a past surgical history that includes hx heent; hx other surgical; hx heart catheterization (> 5 years ago); hx hysterectomy; hx knee arthroscopy; hx mohs procedure; hx orthopaedic; hx urological; hx urological; hx urological; hx other surgical; hx other surgical; hx other surgical; hx back surgery; hx colonoscopy; and hx other surgical.  Social History/Living Environment:   Home Environment: Private residence  # Steps to Enter: 3  Rails to Enter: Yes  Hand Rails : Bilateral  Wheelchair Ramp: No  One/Two Story Residence: One story  Living Alone: Yes  Support Systems: Child(cyndi), Family member(s)  Patient Expects to be Discharged to[de-identified] Rehabilitation facility  Current DME Used/Available at Home: Walker, rolling, Cane, straight, Shower chair, Commode, bedside  Prior Level of Function/Work/Activity:  Lives alone in single story home with 3 steps. Mod I in home with cane or walker. Family assists with transportation. Number of Personal Factors/Comorbidities that affect the Plan of Care: 1-2: MODERATE COMPLEXITY   EXAMINATION:   Most Recent Physical Functioning:   Gross Assessment:                  Posture:     Balance:    Bed Mobility:  Rolling:  Total assistance;Assist x2  Wheelchair Mobility:     Transfers:     Gait:  Left Side Weight Bearing: As tolerated         Body Structures Involved:  1. Bones  2. Joints  3. Muscles Body Functions Affected:  1. Mental  2. Sensory/Pain  3. Movement Related  4. Skin Related Activities and Participation Affected:  1. General Tasks and Demands  2. Mobility  3. Domestic Life  4. Community, Social and [place]   Number of elements that affect the Plan of Care: 4+: HIGH COMPLEXITY   CLINICAL PRESENTATION:   Presentation: Stable and uncomplicated: LOW COMPLEXITY   CLINICAL DECISION MAKIN [facility] Mobility Inpatient Short Form  How much difficulty does the patient currently have. .. Unable A Lot A Little None   1. Turning over in bed (including adjusting bedclothes, sheets and blankets)? ? 1   ? 2   ? 3   ? 4   2. Sitting down on and standing up from a chair with arms ( e.g., wheelchair, bedside commode, etc.)   ? 1   ? 2   ? 3   ? 4   3. Moving from lying on back to sitting on the side of the bed?   ? 1   ? 2   ? 3   ? 4   How much help from another person does the patient currently need. .. Total A Lot A Little None   4. Moving to and from a bed to a chair (including a wheelchair)? ? 1   ? 2   ? 3   ? 4   5. Need to walk in hospital room? ? 1   ? 2   ? 3   ? 4   6. Climbing 3-5 steps with a railing? ? 1   ? 2   ? 3   ? 4   © , Trustees of [address] [place] [address] [address] [phone], under license to [employer]. All rights reserved      Score:  Initial: 9 Most Recent: X (Date: -- )    Interpretation of Tool:  Represents activities that are increasingly more difficult (i.e. Bed mobility, Transfers, Gait). Medical Necessity:     · Patient demonstrates good  ·  rehab potential due to higher previous functional level. Reason for Services/Other Comments:  · Patient continues to demonstrate capacity to improve strength, mobility, balance, transfers, activity tolerance which will increase independence, decrease amount of assistance required from caregiver and increase safety  · .    Use of outcome tool(s) and clinical judgement create a POC that gives a: Clear prediction of patient's progress: LOW COMPLEXITY            TREATMENT:   (In addition to Assessment/Re-Assessment sessions the following treatments were rendered)   Pre-treatment Symptoms/Complaints:    Pain: Initial:   Pain Intensity 1: 2  Post Session:  0/10     Therapeutic Activity: (   18 min ):  Therapeutic activities including Bed mobility (supine<->sit transfers), seated balance and functional activities, Chair transfers via sheet transfer transfer to improve mobility, strength, balance, coordination and activity tolerance . Required maximal assist of 2   to promote static and dynamic balance in standing and promote motor control of bilateral, lower extremity(s). Braces/Orthotics/Lines/Etc:   · O2 Device: Nasal cannula  Treatment/Session Assessment:    · Response to Treatment:  pt performs mobility with max-total assist of 1-2. Interdisciplinary Collaboration:   o Physical Therapy Assistant  o Registered Nurse  o Rehabilitation Attendant  · After treatment position/precautions:   o Up in chair  o Bed alarm/tab alert on  o Bed/Chair-wheels locked  o Bed in low position  o Call light within reach  o RN notified  o Family at bedside   · Compliance with Program/Exercises: Will assess as treatment progresses  · Recommendations/Intent for next treatment session: \"Next visit will focus on advancements to more challenging activities and reduction in assistance provided\".   Total Treatment Duration:  PT Patient Time In/Time Out  Time In: 0905  Time Out: 111 [address]

## 2019-07-04 NOTE — PROGRESS NOTES
Hospitalist Progress Note    Subjective:   Daily Progress Note: 7/4/2019 12:19 PM    Patient with mechanical fall on to left hip 7/1 while cooking presented to ER 7/2 with progressively worsening pain to the hip, especially with attempts to ambulate. She has been walking since the incident with notable pain. Denies LOC, did strike head secondarily. No additional injury. Lives alone with daughter and son in law nearby. Cooks, cares for self. Found with left femoral neck fracture. Underwent left hip hemiarthroplasty 7/3 per Dr Man Becker under SAB. Tolerated well with stable VS, no nausea and good pain control. Daughter and son in law at bedside report recurrent, chronic UTIs under care of Urology. Last UTI 6/18 grew Klebsiella, treated with bactrim DS bid x 7days. Patient on trimpex 1 daily at baseline for suppression. This has been continued. Admission U/A with 5-10 WBC, culture is pending. She will receive ancef x 3 doses per surgical routine, hope to have preliminary urine culture back by then. Daughter also reports recent intermittent visual and auditory hallucinations for about  2 months, always worse with acute UTI. Has seen PHP for this, initially placed on zoloft with worsening symptoms, last week placed on low dose effexor, effects not noted as yet. Currently up in chair with some intermittent hallucinations. Has not been completely evaluated for dementia. Encouraged family to discuss center for [facility] with PHP after release from Rehab.     7/4:  Temp to 100.5 this am.  Urine culture with no growth to date. WBC up to 17.9 with left shift. Poor balance per PT. Attempting to participate but groggy this am.  Remains pale with stable Hgb. No tachycardia. Son at bedside.           Current Facility-Administered Medications   Medication Dose Route Frequency    acetaminophen (TYLENOL) tablet 650 mg  650 mg Oral Q4H PRN    trimethoprim (TRIMPEX) tablet 100 mg  100 mg Oral DAILY    lactated Ringers infusion  100 mL/hr IntraVENous CONTINUOUS    sodium chloride (NS) flush 5-40 mL  5-40 mL IntraVENous Q8H    sodium chloride (NS) flush 5-40 mL  5-40 mL IntraVENous PRN    alum-mag hydroxide-simeth (MYLANTA) oral suspension 30 mL  30 mL Oral Q4H PRN    calcium-vitamin D (OS-JACKIE) 500 mg-200 unit tablet  1 Tab Oral TID WITH MEALS    enoxaparin (LOVENOX) injection 40 mg  40 mg SubCUTAneous DAILY    Lactobacillus Acidoph & Bulgar (FLORANEX) tablet 2 Tab  2 Tab Oral BID    ziprasidone (GEODON) 10 mg in sterile water (preservative free) 0.5 mL injection  10 mg IntraMUSCular Q12H PRN    levothyroxine (SYNTHROID) tablet 75 mcg  75 mcg Oral ACB    allopurinol (ZYLOPRIM) tablet 300 mg  300 mg Oral DAILY    aspirin delayed-release tablet 81 mg  81 mg Oral DAILY    venlafaxine-SR (EFFEXOR-XR) capsule 37.5 mg  37.5 mg Oral DAILY    lisinopril (PRINIVIL, ZESTRIL) tablet 10 mg  10 mg Oral DAILY    oxyCODONE IR (ROXICODONE) tablet 5 mg  5 mg Oral Q6H PRN      Review of Systems  Patient is too drowsy at present. Objective:     Visit Vitals  /68 (BP 1 Location: Left arm, BP Patient Position: At rest)   Pulse (!) 118   Temp (!) 100.5 °F (38.1 °C)   Resp 20   SpO2 97%   Breastfeeding? No    O2 Flow Rate (L/min): 3 l/min O2 Device: Nasal cannula    Temp (24hrs), Av °F (37.2 °C), Min:97.2 °F (36.2 °C), Max:100.5 °F (38.1 °C)    701 - 1900  In: -   Out: 0671 [[id number]]  1901 -  07  In: 8629 [P.O.:120; I.V.:1725]  Out: 1475 [Urine:1275]    General appearance: Groggy all am. Cooperative, pain controlled. Son in law at bedside. Obese.    Head: Normocephalic, without obvious abnormality, atraumatic  Throat: Lips, mucosa, and tongue normal. Teeth and gums normal  Neck: supple, symmetrical, trachea midline, no JVD  Lungs: clear to auscultation bilaterally  Heart: Continued tachycardia , regular rate and rhythm, S1, S2 normal, no murmur, click, rub or gallop  Abdomen: Soft, non-tender. Bowel sounds normal. No masses,  no organomegaly. Farah out this am.  Has not voided yet. Extremities: Dry, intact dressing to left hip. All distal CMS intact. All other extremities normal, atraumatic, no cyanosis or edema  Skin: Skin color pale, texture, turgor normal. No rashes or lesions  Neurologic: Grossly normal     Additional comments: Notes,orders, test results, vitals reviewed    Data Review  Recent Results (from the past 24 hour(s))   CBC WITH AUTOMATED DIFF    Collection Time: 07/03/19  5:25 PM   Result Value Ref Range    WBC 16.1 (H) 4.3 - 11.1 K/uL    RBC 3.20 (L) 4.05 - 5.2 M/uL    HGB 10.4 (L) 11.7 - 15.4 g/dL    HCT 31.2 (L) 35.8 - 46.3 %    MCV 97.5 79.6 - 97.8 FL    MCH 32.5 26.1 - 32.9 PG    MCHC 33.3 31.4 - 35.0 g/dL    RDW 13.5 11.9 - 14.6 %    PLATELET 439 (L) 729 - 450 K/uL    MPV 12.3 9.4 - 12.3 FL    ABSOLUTE NRBC 0.00 0.0 - 0.2 K/uL    DF AUTOMATED      NEUTROPHILS 88 (H) 43 - 78 %    LYMPHOCYTES 5 (L) 13 - 44 %    MONOCYTES 5 4.0 - 12.0 %    EOSINOPHILS 1 0.5 - 7.8 %    BASOPHILS 1 0.0 - 2.0 %    IMMATURE GRANULOCYTES 1 0.0 - 5.0 %    ABS. NEUTROPHILS 14.2 (H) 1.7 - 8.2 K/UL    ABS. LYMPHOCYTES 0.8 0.5 - 4.6 K/UL    ABS. MONOCYTES 0.8 0.1 - 1.3 K/UL    ABS. EOSINOPHILS 0.2 0.0 - 0.8 K/UL    ABS. BASOPHILS 0.1 0.0 - 0.2 K/UL    ABS. IMM.  GRANS. 0.1 0.0 - 0.5 K/UL   PLEASE READ & DOCUMENT PPD TEST IN 24 HRS    Collection Time: 07/03/19  6:32 PM   Result Value Ref Range    PPD Negative Negative    mm Induration 0 0 - 5 mm   HGB & HCT    Collection Time: 07/03/19  7:51 PM   Result Value Ref Range    HGB 10.5 (L) 11.7 - 15.4 g/dL    HCT 31.6 (L) 35.8 - 46.3 %   CBC WITH AUTOMATED DIFF    Collection Time: 07/04/19  4:50 AM   Result Value Ref Range    WBC 17.9 (H) 4.3 - 11.1 K/uL    RBC 3.04 (L) 4.05 - 5.2 M/uL    HGB 10.0 (L) 11.7 - 15.4 g/dL    HCT 29.6 (L) 35.8 - 46.3 %    MCV 97.4 79.6 - 97.8 FL    MCH 32.9 26.1 - 32.9 PG    MCHC 33.8 31.4 - 35.0 g/dL    RDW 13.6 11.9 - 14.6 %    PLATELET 872 022 - 545 K/uL    MPV 13.0 (H) 9.4 - 12.3 FL    ABSOLUTE NRBC 0.00 0.0 - 0.2 K/uL    DF AUTOMATED      NEUTROPHILS 87 (H) 43 - 78 %    LYMPHOCYTES 6 (L) 13 - 44 %    MONOCYTES 6 4.0 - 12.0 %    EOSINOPHILS 0 (L) 0.5 - 7.8 %    BASOPHILS 0 0.0 - 2.0 %    IMMATURE GRANULOCYTES 1 0.0 - 5.0 %    ABS. NEUTROPHILS 15.6 (H) 1.7 - 8.2 K/UL    ABS. LYMPHOCYTES 1.1 0.5 - 4.6 K/UL    ABS. MONOCYTES 1.0 0.1 - 1.3 K/UL    ABS. EOSINOPHILS 0.0 0.0 - 0.8 K/UL    ABS. BASOPHILS 0.1 0.0 - 0.2 K/UL    ABS. IMM. GRANS. 0.1 0.0 - 0.5 K/UL   METABOLIC PANEL, BASIC    Collection Time: 07/04/19  4:50 AM   Result Value Ref Range    Sodium 137 136 - 145 mmol/L    Potassium 4.4 3.5 - 5.1 mmol/L    Chloride 107 98 - 107 mmol/L    CO2 21 21 - 32 mmol/L    Anion gap 9 7 - 16 mmol/L    Glucose 124 (H) 65 - 100 mg/dL    BUN 19 8 - 23 MG/DL    Creatinine 1.15 (H) 0.6 - 1.0 MG/DL    GFR est AA 59 (L) >60 ml/min/1.73m2    GFR est non-AA 49 (L) >60 ml/min/1.73m2    Calcium 8.9 8.3 - 10.4 MG/DL   MAGNESIUM    Collection Time: 07/04/19  4:50 AM   Result Value Ref Range    Magnesium 1.7 (L) 1.8 - 2.4 mg/dL      7/2: LEFT HIP: Subcapital left hip fracture     7/2:  LEFT FEMUR:  There is a fracture at the junction of the femoral head and neck. A left knee prosthesis is present      7/2:  CXR: There is no consolidation, pleural effusions or visible pneumothorax.     Assessment/Plan:   Mechanical fall  Left femoral fracture at the juncture between head and neck              S/P Left hip hemiarthroplasty 7/3              Routine post op hip fracture orders              Plans for rehab on discharge, CM on board              Continue PT, OT interventions                         Rehab MD in for consult 7/3  Intermittent visual and auditory hallucinations with approx 2 month history of same              Recently begun on effexor              Did not tolerate zoloft              Geodon 10 mg IM q 12 hours prn              Will need Gerontology testing post rehab  Urinary retention with recurrent UTI, neurogenic bladder              No history of ESBL              Most recent UTI 6/18 with Klebsiella: treated with bactrim DS bid x 7 days                Add probiotics x 10 days              Taking trimpex daily for prophylaxis, continue for now              Urine culture pending: NGTD              Will not add abx until culture resulted, continue routine ancef 3 doses for now   Farah removed am 7/4, waiting for patient to void  HTN:  holding home lisinopril due to hypotension              Monitor  Post op hypotension:  Sustained   Adding 500 ml bolus LR 7/4   Continue to monitor, not tachycardic  Post op pallor              Daily CBC   Hypothyroidism:  Continue home meds              Last TSH 1.090 5/19  Suspect CKD, creatine currently under baseline              Continue to monitor  Depression:  Recently placed on effexor  Debility              Caution for falls       Care Plan discussed with: Patient, Son, and Nurse    Signed By: Alejandra Thomas NP     July 4, 2019

## 2019-07-05 ENCOUNTER — APPOINTMENT (OUTPATIENT)
Dept: MRI IMAGING | Age: 79
DRG: 470 | End: 2019-07-05
Attending: NURSE PRACTITIONER
Payer: MEDICARE

## 2019-07-05 LAB
ABO + RH BLD: NORMAL
ANION GAP SERPL CALC-SCNC: 7 MMOL/L (ref 7–16)
BACTERIA SPEC CULT: NORMAL
BASOPHILS # BLD: 0 K/UL (ref 0–0.2)
BASOPHILS NFR BLD: 0 % (ref 0–2)
BLD PROD TYP BPU: NORMAL
BLOOD GROUP ANTIBODIES SERPL: NORMAL
BPU ID: NORMAL
BUN SERPL-MCNC: 28 MG/DL (ref 8–23)
CALCIUM SERPL-MCNC: 8.9 MG/DL (ref 8.3–10.4)
CHLORIDE SERPL-SCNC: 106 MMOL/L (ref 98–107)
CO2 SERPL-SCNC: 24 MMOL/L (ref 21–32)
CREAT SERPL-MCNC: 1.22 MG/DL (ref 0.6–1)
CROSSMATCH RESULT,%XM: NORMAL
DIFFERENTIAL METHOD BLD: ABNORMAL
EOSINOPHIL # BLD: 0.1 K/UL (ref 0–0.8)
EOSINOPHIL NFR BLD: 1 % (ref 0.5–7.8)
ERYTHROCYTE [DISTWIDTH] IN BLOOD BY AUTOMATED COUNT: 14 % (ref 11.9–14.6)
GLUCOSE SERPL-MCNC: 107 MG/DL (ref 65–100)
HCT VFR BLD AUTO: 23.2 % (ref 35.8–46.3)
HCT VFR BLD AUTO: 24.5 % (ref 35.8–46.3)
HCT VFR BLD AUTO: 25.1 % (ref 35.8–46.3)
HGB BLD-MCNC: 7.9 G/DL (ref 11.7–15.4)
HGB BLD-MCNC: 8.3 G/DL (ref 11.7–15.4)
HGB BLD-MCNC: 8.3 G/DL (ref 11.7–15.4)
IMM GRANULOCYTES # BLD AUTO: 0.1 K/UL (ref 0–0.5)
IMM GRANULOCYTES NFR BLD AUTO: 1 % (ref 0–5)
LYMPHOCYTES # BLD: 1.2 K/UL (ref 0.5–4.6)
LYMPHOCYTES NFR BLD: 9 % (ref 13–44)
MCH RBC QN AUTO: 32.2 PG (ref 26.1–32.9)
MCHC RBC AUTO-ENTMCNC: 33.9 G/DL (ref 31.4–35)
MCV RBC AUTO: 95 FL (ref 79.6–97.8)
MM INDURATION POC: 0 MM (ref 0–5)
MONOCYTES # BLD: 0.8 K/UL (ref 0.1–1.3)
MONOCYTES NFR BLD: 6 % (ref 4–12)
NEUTS SEG # BLD: 10.7 K/UL (ref 1.7–8.2)
NEUTS SEG NFR BLD: 83 % (ref 43–78)
NRBC # BLD: 0 K/UL (ref 0–0.2)
PLATELET # BLD AUTO: 111 K/UL (ref 150–450)
PMV BLD AUTO: 13 FL (ref 9.4–12.3)
POTASSIUM SERPL-SCNC: 4.4 MMOL/L (ref 3.5–5.1)
PPD POC: NEGATIVE NEGATIVE
RBC # BLD AUTO: 2.58 M/UL (ref 4.05–5.2)
SERVICE CMNT-IMP: NORMAL
SODIUM SERPL-SCNC: 137 MMOL/L (ref 136–145)
SPECIMEN EXP DATE BLD: NORMAL
STATUS OF UNIT,%ST: NORMAL
UNIT DIVISION, %UDIV: 0
WBC # BLD AUTO: 12.8 K/UL (ref 4.3–11.1)

## 2019-07-05 PROCEDURE — 70551 MRI BRAIN STEM W/O DYE: CPT

## 2019-07-05 PROCEDURE — 74011250637 HC RX REV CODE- 250/637: Performed by: INTERNAL MEDICINE

## 2019-07-05 PROCEDURE — 65270000029 HC RM PRIVATE

## 2019-07-05 PROCEDURE — 77030020255 HC SOL INJ LR 1000ML BG

## 2019-07-05 PROCEDURE — 74011250637 HC RX REV CODE- 250/637: Performed by: ORTHOPAEDIC SURGERY

## 2019-07-05 PROCEDURE — 74011250637 HC RX REV CODE- 250/637: Performed by: NURSE PRACTITIONER

## 2019-07-05 PROCEDURE — 85018 HEMOGLOBIN: CPT

## 2019-07-05 PROCEDURE — 74011250636 HC RX REV CODE- 250/636: Performed by: ORTHOPAEDIC SURGERY

## 2019-07-05 PROCEDURE — 85025 COMPLETE CBC W/AUTO DIFF WBC: CPT

## 2019-07-05 PROCEDURE — 36415 COLL VENOUS BLD VENIPUNCTURE: CPT

## 2019-07-05 PROCEDURE — 97530 THERAPEUTIC ACTIVITIES: CPT

## 2019-07-05 PROCEDURE — 80048 BASIC METABOLIC PNL TOTAL CA: CPT

## 2019-07-05 PROCEDURE — 74011250636 HC RX REV CODE- 250/636: Performed by: INTERNAL MEDICINE

## 2019-07-05 RX ADMIN — MAGNESIUM GLUCONATE 500 MG ORAL TABLET 400 MG: 500 TABLET ORAL at 09:19

## 2019-07-05 RX ADMIN — LACTOBACILLUS TAB 2 TABLET: TAB at 09:18

## 2019-07-05 RX ADMIN — ALLOPURINOL 300 MG: 100 TABLET ORAL at 09:20

## 2019-07-05 RX ADMIN — ACETAMINOPHEN 650 MG: 325 TABLET, FILM COATED ORAL at 17:20

## 2019-07-05 RX ADMIN — ACETAMINOPHEN 650 MG: 325 TABLET, FILM COATED ORAL at 21:54

## 2019-07-05 RX ADMIN — LEVOTHYROXINE SODIUM 75 MCG: 75 TABLET ORAL at 05:33

## 2019-07-05 RX ADMIN — Medication 10 ML: at 21:52

## 2019-07-05 RX ADMIN — SODIUM CHLORIDE, SODIUM LACTATE, POTASSIUM CHLORIDE, AND CALCIUM CHLORIDE 100 ML/HR: 600; 310; 30; 20 INJECTION, SOLUTION INTRAVENOUS at 21:46

## 2019-07-05 RX ADMIN — ACETAMINOPHEN 650 MG: 325 TABLET, FILM COATED ORAL at 05:33

## 2019-07-05 RX ADMIN — CALCIUM CARBONATE 500 MG (1,250 MG)-VITAMIN D3 200 UNIT TABLET 1 TABLET: at 13:07

## 2019-07-05 RX ADMIN — TRIMETHOPRIM 100 MG: 100 TABLET ORAL at 09:18

## 2019-07-05 RX ADMIN — CALCIUM CARBONATE 500 MG (1,250 MG)-VITAMIN D3 200 UNIT TABLET 1 TABLET: at 09:20

## 2019-07-05 RX ADMIN — VENLAFAXINE HYDROCHLORIDE 37.5 MG: 37.5 CAPSULE, EXTENDED RELEASE ORAL at 09:19

## 2019-07-05 RX ADMIN — Medication 5 ML: at 13:08

## 2019-07-05 RX ADMIN — LACTOBACILLUS TAB 2 TABLET: TAB at 17:14

## 2019-07-05 RX ADMIN — ENOXAPARIN SODIUM 40 MG: 40 INJECTION SUBCUTANEOUS at 09:17

## 2019-07-05 RX ADMIN — ASPIRIN 81 MG: 81 TABLET ORAL at 09:19

## 2019-07-05 RX ADMIN — CALCIUM CARBONATE 500 MG (1,250 MG)-VITAMIN D3 200 UNIT TABLET 1 TABLET: at 17:14

## 2019-07-05 RX ADMIN — OXYCODONE HYDROCHLORIDE 5 MG: 5 TABLET ORAL at 09:21

## 2019-07-05 NOTE — PROGRESS NOTES
[facility] FRACTURE PROGRESS NOTE    2019  Admit Date:   2019    Post Op day: 2 Days Post-Op    Subjective:    Colbyasia Chapman More alert today.  Very poor PT progress    PT/OT:   Gait:                    Vital Signs:    Patient Vitals for the past 8 hrs:   BP Temp Pulse Resp SpO2   19 1137 100/64 99 °F (37.2 °C) 82 19 95 %   19 0739 104/64 99.4 °F (37.4 °C) 81 19 95 %     Temp (24hrs), Av.8 °F (37.1 °C), Min:98 °F (36.7 °C), Max:100 °F (37.8 °C)      Pain Control:   Pain Assessment  Pain Scale 1: FLACC  Pain Intensity 1: 0  Pain Onset 1: post op  Pain Location 1: Hip  Pain Orientation 1: Left  Pain Description 1: Aching  Pain Intervention(s) 1: Medication (see MAR)    Meds:    Current Facility-Administered Medications   Medication Dose Route Frequency    acetaminophen (TYLENOL) tablet 650 mg  650 mg Oral Q4H PRN    magnesium oxide (MAG-OX) tablet 400 mg  400 mg Oral DAILY    0.9% sodium chloride infusion 250 mL  250 mL IntraVENous PRN    trimethoprim (TRIMPEX) tablet 100 mg  100 mg Oral DAILY    lactated Ringers infusion  100 mL/hr IntraVENous CONTINUOUS    sodium chloride (NS) flush 5-40 mL  5-40 mL IntraVENous Q8H    sodium chloride (NS) flush 5-40 mL  5-40 mL IntraVENous PRN    alum-mag hydroxide-simeth (MYLANTA) oral suspension 30 mL  30 mL Oral Q4H PRN    calcium-vitamin D (OS-JACKIE) 500 mg-200 unit tablet  1 Tab Oral TID WITH MEALS    enoxaparin (LOVENOX) injection 40 mg  40 mg SubCUTAneous DAILY    Lactobacillus Acidoph & Bulgar (FLORANEX) tablet 2 Tab  2 Tab Oral BID    ziprasidone (GEODON) 10 mg in sterile water (preservative free) 0.5 mL injection  10 mg IntraMUSCular Q12H PRN    levothyroxine (SYNTHROID) tablet 75 mcg  75 mcg Oral ACB    allopurinol (ZYLOPRIM) tablet 300 mg  300 mg Oral DAILY    aspirin delayed-release tablet 81 mg  81 mg Oral DAILY    venlafaxine-SR (EFFEXOR-XR) capsule 37.5 mg  37.5 mg Oral DAILY    [Held by provider] lisinopril (PRINIVIL, ZESTRIL) tablet 10 mg  10 mg Oral DAILY    oxyCODONE IR (ROXICODONE) tablet 5 mg  5 mg Oral Q6H PRN       LAB:    Recent Labs     07/05/19  1308  07/02/19  1524   HCT 25.1*   < > 35.6*   HGB 8.3*   < > 12.0   INR  --   --  1.1    < > = values in this interval not displayed.        24 Hour Assessment Issues:    Disoriented (baseline)    Discharge Planning: SNF    Transfuse PRBC's:      Assessment & Physician's Comment:  Dressing is clean, dry, and intact  Neurovascular checks within normal limits    Principal Problem:    Closed left hip fracture ([address] (7/2/2019)    Active Problems:    HTN (hypertension) (9/10/2010)      Hypothyroidism (9/10/2010)      Osteoarthritis of left knee (3/29/2013)      Neurogenic bladder (2/20/2014)      Incontinence (4/10/2014)      Depression ()      Recurrent UTI (4/25/2016)        Plan:continue rehab      Mason Clarke DO

## 2019-07-05 NOTE — PROGRESS NOTES
PT Daily Note:  Will HOLD PT this afternoon as patient is waiting to leave the floor for an MRI. Will check back on patient tomorrow.   Thank you,  Roger Laureano, PTA

## 2019-07-05 NOTE — PROGRESS NOTES
Problem: Mobility Impaired (Adult and Pediatric)  Goal: *Acute Goals and Plan of Care (Insert Text)  Description  LTG:  (1.)Ms. Sindhu Yni will move from supine to sit and sit to supine , scoot up and down and roll side to side with MINIMAL ASSIST within 7 treatment day(s). (2.)Ms. Sindhu Yin will transfer from bed to chair and chair to bed with MINIMAL ASSIST using the least restrictive device within 7 treatment day(s). (3.)Ms. Sindhu Yin will ambulate with MODERATE ASSIST for 10 feet with the least restrictive device within 7 treatment day(s). (4.)Ms. Sindhu Yin will perform exercises per HEP for 10+ minutes to improve strength and mobility within 7 days. ________________________________________________________________________________________________   Outcome: Progressing Towards Goal     PHYSICAL THERAPY: Daily Note and AM 7/5/2019  INPATIENT: PT Visit Days : 3  [place] / [place] / Product Type: Medicare /       NAME/AGE/GENDER: Elena Pickard is a 69 y.o. female   PRIMARY DIAGNOSIS: Closed left hip fracture (HCC) [S72.002A] Closed left hip fracture (HCC)   Closed left hip fracture (HCC)    Procedure(s) (LRB):  LEFT HIP HEMIARTHROPLASTY (Left)  2 Days Post-Op  ICD-10: Treatment Diagnosis:    · Generalized Muscle Weakness (M62.81)  · Difficulty in walking, Not elsewhere classified (R26.2)  · Other abnormalities of gait and mobility (R26.89)  · History of falling (Z91.81)   Precaution/Allergies:  Adhesive tape-silicones; Clarithromycin; Lortab [hydrocodone-acetaminophen]; Other medication; Peanut; Prednisone; and Prevacid [lansoprazole]      ASSESSMENT:     Ms. Sindhu Yin is a 69year old female admitted from home with left hip fracture after fall at home; she is s/p left hip hemiarthroplasty and is WBAT per ortho orders. At baseline pt lives at home alone and is ambulatory with cane or walker around home.  Does not drive- family lives locally and can assist.   Patient was supine upon contact and agreeable to PT with encouragement. Patient is drowsy and lethargic needing max cues to stay on task and participate. Patient is reluctant to actively move any body part, joint, or extremity including moving fingers, toes, lifting head, etc. Patient requires total assist x 2 for all mobility including rolling and supine <-> sit. Once seated EOB patient requries constant support due to poor sitting balance and difficulty improving sitting balance. Patient sits EOB x 10 minutes with no improvement in balance. Deemed transfer training unsafe at this time due to aforementioned. Patient returns to supine with total assist x 2 where she rolls left and right with total assist x 2 to adjust linens under her. Overall slow progress towards physical therapy goals. Patient's goals listed above are still appropriate. Will continue skilled PT to address remaining deficits. This section established at most recent assessment   PROBLEM LIST (Impairments causing functional limitations):  1. Decreased Strength  2. Decreased ADL/Functional Activities  3. Decreased Transfer Abilities  4. Decreased Ambulation Ability/Technique  5. Decreased Balance  6. Increased Pain  7. Decreased Activity Tolerance  8. Decreased Knowledge of Precautions  9. Decreased Skin Integrity/Hygeine  10. Decreased Cognition   INTERVENTIONS PLANNED: (Benefits and precautions of physical therapy have been discussed with the patient.)  1. Balance Exercise  2. Bed Mobility  3. Gait Training  4. Home Exercise Program (HEP)  5. Therapeutic Activites  6. Therapeutic Exercise/Strengthening  7. Transfer Training     TREATMENT PLAN: Frequency/Duration: twice daily for duration of hospital stay  Rehabilitation Potential For Stated Goals: [facility] (at time of discharge pending progress):    Placement: It is my opinion, based on this patient's performance to date, that Ms. Carroll Sampson may benefit from intensive therapy at a SKILLED NURSING FACILITY after discharge due to the functional deficits listed above that are likely to improve with skilled rehabilitation and concerns that he/she may be unsafe to be unsupervised at home due to fall, weakness, limited mobility, post op pain, need for significant assist with mobility . Equipment:    Tbd pending progress               HISTORY:   History of Present Injury/Illness (Reason for Referral):  Per H&P, \"Mrs. Esteban Hennessy is a nice 68 y/o WF with a h/o frequent UTIs on daily antibiotic suppression (TMP and doxycycline), hypothyroidism, CVA, HTN, MDD presenting s/p fall yesterday at home. She was in the kitchen cooking and was trying to \"move too quickly\" when she lost her balance and fell onto her left side. She noticed pain of the left hip but managed somewhat ok throughout yesterday, but pain persisted so she came to the ED today. Imaging shows left subcapital hip fracture. She denies chest pain, SOB, palpitations, dizziness or syncope at any point during her fall yesterday. Family is present and note that she has had frequent falls over the past several months, but has not had a fall in a few weeks. They thought it was neuropathy. She also recently saw Urology for another UTI, culture grew klebsiella pna sensitive to TMP (but resistant to doxy) which is what she was prescribed (in addition to daily doxycycline).  Hospitalist consulted for admission\"    Past Medical History/Comorbidities:   Ms. Esteban Hennessy  has a past medical history of Acute on Chronic UTI (9/10/2010), Allergic rhinitis, AMD (age-related macular degeneration), bilateral, ARF (acute renal failure) ([facility] [address] (12/13/2010), Arrhythmia, Ataxia, C. difficile colitis (12/17/2010), Calculus of kidney (2/20/2014), Cancer ([facility] [address] Chest pain, Cystocele, midline (2/20/2014), Depression, Falls frequently, GERD (gastroesophageal reflux disease), Gross hematuria (2/20/2014), Headache, Hematuria, microscopic, Hypertension, Hypomagnesemia (12/13/2010), Hypothyroidism, Incomplete bladder emptying (2/20/2014), Incontinence (4/10/2014), Kidney stones, Menopausal syndrome, Microscopic hematuria (2/20/2014), Mixed incontinence urge and stress (male)(female) (2/20/2014), Neurogenic bladder, NOS (2/20/2014), Neuropathy, Obese, Osteoarthritis of left knee (3/29/2013), Other chronic cystitis (2/20/2014), Other musculoskeletal symptoms referable to limbs(729.89) (2/20/2014), Other nonspecific finding on examination of urine (2/20/2014), Pneumonia RLL infiltrate (12/17/2010), Polyneuropathy, Postmenopausal atrophic vaginitis (2/20/2014), Sepsis (9/10/2010), Splitting of urinary stream (2/20/2014), St[facility] Total knee replacement status (3/29/2013), Unspecified disorder of bladder (2/20/2014), Unspecified pyelonephritis (9/10/2010), Unspecified urinary incontinence (2/20/2014), Urge incontinence (2/20/2014), and UTI (urinary tract infection) Enterobacter (12/17/2010). Ms. Jacinto Cooks  has a past surgical history that includes hx heent; hx other surgical; hx heart catheterization (> 5 years ago); hx hysterectomy; hx knee arthroscopy; hx mohs procedure; hx orthopaedic; hx urological; hx urological; hx urological; hx other surgical; hx other surgical; hx other surgical; hx back surgery; hx colonoscopy; and hx other surgical.  Social History/Living Environment:   Home Environment: Private residence  # Steps to Enter: 3  Rails to Enter: Yes  Hand Rails : Bilateral  Wheelchair Ramp: No  One/Two Story Residence: One story  Living Alone: Yes  Support Systems: Child(cyndi), Family member(s)  Patient Expects to be Discharged to[de-identified] Rehabilitation facility  Current DME Used/Available at Home: Walker, rolling, Cane, straight, Shower chair, Commode, bedside  Prior Level of Function/Work/Activity:  Lives alone in single story home with 3 steps. Mod I in home with cane or walker. Family assists with transportation.       Number of Personal Factors/Comorbidities that affect the Plan of Care: 1-2: MODERATE COMPLEXITY   EXAMINATION:   Most Recent Physical Functioning:   Gross Assessment:                  Posture:     Balance:  Sitting: Impaired  Sitting - Static: Poor (constant support)  Sitting - Dynamic: Poor (constant support)  Standing: (unsafe to attempt) Bed Mobility:  Rolling: Total assistance;Assist x2  Supine to Sit: Total assistance;Assist x2  Sit to Supine: Total assistance;Assist x2  Wheelchair Mobility:     Transfers:  Sit to Stand: (unsafe to attempt)  Gait:            Body Structures Involved:  1. Bones  2. Joints  3. Muscles Body Functions Affected:  1. Mental  2. Sensory/Pain  3. Movement Related  4. Skin Related Activities and Participation Affected:  1. General Tasks and Demands  2. Mobility  3. Domestic Life  4. Community, Social and [place]   Number of elements that affect the Plan of Care: 4+: HIGH COMPLEXITY   CLINICAL PRESENTATION:   Presentation: Stable and uncomplicated: LOW COMPLEXITY   CLINICAL DECISION MAKIN [facility] Mobility Inpatient Short Form  How much difficulty does the patient currently have. .. Unable A Lot A Little None   1. Turning over in bed (including adjusting bedclothes, sheets and blankets)? ? 1   ? 2   ? 3   ? 4   2. Sitting down on and standing up from a chair with arms ( e.g., wheelchair, bedside commode, etc.)   ? 1   ? 2   ? 3   ? 4   3. Moving from lying on back to sitting on the side of the bed?   ? 1   ? 2   ? 3   ? 4   How much help from another person does the patient currently need. .. Total A Lot A Little None   4. Moving to and from a bed to a chair (including a wheelchair)? ? 1   ? 2   ? 3   ? 4   5. Need to walk in hospital room? ? 1   ? 2   ? 3   ? 4   6. Climbing 3-5 steps with a railing? ? 1   ? 2   ? 3   ? 4   © , Trustees of [address] [place] [address] [address] [id number], under license to [employer].  All rights reserved      Score:  Initial: 9 Most Recent: X (Date: -- )    Interpretation of Tool: Represents activities that are increasingly more difficult (i.e. Bed mobility, Transfers, Gait). Medical Necessity:     · Patient demonstrates good  ·  rehab potential due to higher previous functional level. Reason for Services/Other Comments:  · Patient continues to demonstrate capacity to improve strength, mobility, balance, transfers, activity tolerance which will increase independence, decrease amount of assistance required from caregiver and increase safety  · . Use of outcome tool(s) and clinical judgement create a POC that gives a: Clear prediction of patient's progress: LOW COMPLEXITY            TREATMENT:   (In addition to Assessment/Re-Assessment sessions the following treatments were rendered)   Pre-treatment Symptoms/Complaints:  None  Pain: Initial:   Pain Intensity 1: 0  Post Session:  0/10     Therapeutic Activity: (   25 minutes ):  Therapeutic activities including bed mobility training including rolling left and right and supine <-> sit, static sitting balance training, scooting, posture training, trunk control, any active movement of any joint or extremity and patient education improve mobility, strength, balance, coordination and activity tolerance . Required maximal verbal, tactile and manual cues   to promote static and dynamic balance in standing and promote motor control of bilateral, lower extremity(s). Braces/Orthotics/Lines/Etc:   · O2 Device: Nasal cannula  Treatment/Session Assessment:    · Response to Treatment: see above  Interdisciplinary Collaboration:   o Physical Therapy Assistant  o Registered Nurse  o Rehabilitation Attendant  · After treatment position/precautions:   o Supine in bed  o Bed alarm/tab alert on  o Bed/Chair-wheels locked  o Bed in low position  o Call light within reach  o RN notified  o Family at bedside   · Compliance with Program/Exercises: Will assess as treatment progresses  · Recommendations/Intent for next treatment session:   \"Next visit will focus on advancements to more challenging activities and reduction in assistance provided\".   Total Treatment Duration:  PT Patient Time In/Time Out  Time In: 1000  Time Out: [address] RYAN Camejo

## 2019-07-05 NOTE — PROGRESS NOTES
Progress Note    2019  Admit Date: 2019  2:16 PM   NAME: Elo Mcwilliams   :  1940   MRN:  [id number]   Attending: Slick Almaguer MD  PCP:  Ruth Cole MD  Treatment Team: Attending Provider: Slick Almaguer MD; Consulting Provider: Lizz Garcia DO; Utilization Review: Severiano Sharps; Consulting Provider: Vince Baker MD; Care Manager: Tejal Marvin RN; Occupational Therapist: Anh Torres    Full Code   SUBJECTIVE:     Ms. Magdalena Rayo is a 70 yo female with PMH of chronc UTIs, c.diff colitis, depression, HTN, hypothyroidism, neuropathy who presented after falling with c/o left hip pain. Found to have a left femoral neck fx. S/p repair 7/3/19. Extensive conversation with daughter today in regards to declining mentation. Daughter reports over the last 8-9 months pt has had vivid dreams, visual hallucinations, memory loss. Reports shuffling gait, some tremor on exertion of UE. She was dx with psychosis and depression and started on zoloft without improvement and effexor without improvement by PCP. Pt on chronic UTI suppression with trimpex. Pt also with increasing falls due to unsteady gait. Also with truncal weakness. Pt is calm today during my exam.  Had episode of hypotension, fever and pallor last night. Had leukocytosis on admission but improved. UA, CXR normal.  She was given IVF and 1 unit PRBC for drop in hgb.         Past Medical History:   Diagnosis Date    Acute on Chronic UTI 9/10/2010    Allergic rhinitis     AMD (age-related macular degeneration), bilateral     ARF (acute renal failure) ([address] 2010    Arrhythmia     hx tachycardia     Ataxia     C. difficile colitis 2010    Calculus of kidney 2014    Cancer ([facility] [address]     neck skin ca 2 times    Chest pain     Cystocele, midline 2014    Depression     Falls frequently      last fall 10/11/13    GERD (gastroesophageal reflux disease)     takes OTC  Gross hematuria 2/20/2014    Headache     Hematuria, microscopic     Hypertension     controlled with meds     Hypomagnesemia 12/13/2010    Hypothyroidism     Incomplete bladder emptying 2/20/2014    Incontinence 4/10/2014    Kidney stones     Menopausal syndrome     Microscopic hematuria 2/20/2014    Mixed incontinence urge and stress (male)(female) 2/20/2014    Neurogenic bladder, NOS 2/20/2014    Neuropathy      lower legs    Obese     Osteoarthritis of left knee 3/29/2013    Other chronic cystitis 2/20/2014    Other musculoskeletal symptoms referable to limbs(729.89) 2/20/2014    Other nonspecific finding on examination of urine 2/20/2014    Pneumonia RLL infiltrate 12/17/2010    Polyneuropathy     Postmenopausal atrophic vaginitis 2/20/2014    Sepsis 9/10/2010    Splitting of urinary stream 2/20/2014    Stroke [facility])     OLGA Mena Total knee replacement status 3/29/2013    Unspecified disorder of bladder 2/20/2014    Unspecified pyelonephritis 9/10/2010    Unspecified urinary incontinence 2/20/2014    S/p Anterior Elevate repair, trans obturator sling    Urge incontinence 2/20/2014    UTI (urinary tract infection) Enterobacter 12/17/2010     Recent Results (from the past 24 hour(s))   PLEASE READ & DOCUMENT PPD TEST IN 48 HRS    Collection Time: 07/04/19  6:32 PM   Result Value Ref Range    PPD Negative Negative    mm Induration 0 0 - 5 mm   HGB & HCT    Collection Time: 07/04/19  7:54 PM   Result Value Ref Range    HGB 8.4 (L) 11.7 - 15.4 g/dL    HCT 25.0 (L) 35.8 - 46.3 %   CBC WITH AUTOMATED DIFF    Collection Time: 07/05/19  7:25 AM   Result Value Ref Range    WBC 12.8 (H) 4.3 - 11.1 K/uL    RBC 2.58 (L) 4.05 - 5.2 M/uL    HGB 8.3 (L) 11.7 - 15.4 g/dL    HCT 24.5 (L) 35.8 - 46.3 %    MCV 95.0 79.6 - 97.8 FL    MCH 32.2 26.1 - 32.9 PG    MCHC 33.9 31.4 - 35.0 g/dL    RDW 14.0 11.9 - 14.6 %    PLATELET 024 (L) 039 - 450 K/uL    MPV 13.0 (H) 9.4 - 12.3 FL ABSOLUTE NRBC 0.00 0.0 - 0.2 K/uL    DF AUTOMATED      NEUTROPHILS 83 (H) 43 - 78 %    LYMPHOCYTES 9 (L) 13 - 44 %    MONOCYTES 6 4.0 - 12.0 %    EOSINOPHILS 1 0.5 - 7.8 %    BASOPHILS 0 0.0 - 2.0 %    IMMATURE GRANULOCYTES 1 0.0 - 5.0 %    ABS. NEUTROPHILS 10.7 (H) 1.7 - 8.2 K/UL    ABS. LYMPHOCYTES 1.2 0.5 - 4.6 K/UL    ABS. MONOCYTES 0.8 0.1 - 1.3 K/UL    ABS. EOSINOPHILS 0.1 0.0 - 0.8 K/UL    ABS. BASOPHILS 0.0 0.0 - 0.2 K/UL    ABS. IMM.  GRANS. 0.1 0.0 - 0.5 K/UL   METABOLIC PANEL, BASIC    Collection Time: 07/05/19  7:25 AM   Result Value Ref Range    Sodium 137 136 - 145 mmol/L    Potassium 4.4 3.5 - 5.1 mmol/L    Chloride 106 98 - 107 mmol/L    CO2 24 21 - 32 mmol/L    Anion gap 7 7 - 16 mmol/L    Glucose 107 (H) 65 - 100 mg/dL    BUN 28 (H) 8 - 23 MG/DL    Creatinine 1.22 (H) 0.6 - 1.0 MG/DL    GFR est AA 55 (L) >60 ml/min/1.73m2    GFR est non-AA 45 (L) >60 ml/min/1.73m2    Calcium 8.9 8.3 - 10.4 MG/DL   HGB & HCT    Collection Time: 07/05/19  1:08 PM   Result Value Ref Range    HGB 8.3 (L) 11.7 - 15.4 g/dL    HCT 25.1 (L) 35.8 - 46.3 %     Allergies   Allergen Reactions    Adhesive Tape-Silicones Rash    Clarithromycin Rash    Lortab [Hydrocodone-Acetaminophen] Nausea and Vomiting    Other Medication Nausea and Vomiting     Antibiotic for abd infection- Prev-pack given for H. Pylori    Peanut Itching    Prednisone Nausea and Vomiting     Medrol dose pack    Prevacid [Lansoprazole] Nausea and Vomiting     Current Facility-Administered Medications   Medication Dose Route Frequency Provider Last Rate Last Dose    acetaminophen (TYLENOL) tablet 650 mg  650 mg Oral Q4H PRN Norma Mcdaniel NP   650 mg at 07/05/19 0533    magnesium oxide (MAG-OX) tablet 400 mg  400 mg Oral DAILY Norma Mcdaniel NP   400 mg at 07/05/19 0919    0.9% sodium chloride infusion 250 mL  250 mL IntraVENous PRN Harry Do MD        trimethoprim (TRIMPEX) tablet 100 mg  100 mg Oral DAILY Jason Mcdaniel, GILBERT 100 mg at 19 1149    lactated Ringers infusion  100 mL/hr IntraVENous CONTINUOUS Fidel Perez  mL/hr at 198 100 mL/hr at 19 212    sodium chloride (NS) flush 5-40 mL  5-40 mL IntraVENous Q8H Felecia Dach, DO   5 mL at 19 1308    sodium chloride (NS) flush 5-40 mL  5-40 mL IntraVENous PRN Felecia Dach, DO        alum-mag hydroxide-[facility]) oral suspension 30 mL  30 mL Oral Q4H PRN Felecia Dach, DO        calcium-vitamin D (OS-JACKIE) 500 mg-200 unit tablet  1 Tab Oral TID WITH MEALS Felecia Dach, DO   1 Tab at 19 1307    enoxaparin (LOVENOX) injection 40 mg  40 mg SubCUTAneous DAILY [facility], DO   40 mg at 19 1047    Lactobacillus Acidoph & Bulgar [facility]) tablet 2 Tab  2 Tab Oral BID Dali Mehta NP   2 Tab at 19 0918    ziprasidone (GEODON) 10 mg in sterile water (preservative free) 0.5 mL injection  10 mg IntraMUSCular Q12H PRN Alexis Mcdaniel NP        levothyroxine (SYNTHROID) tablet 75 mcg  75 mcg Oral ACB Luisana Darnell MD   75 mcg at 19 0533    allopurinol (ZYLOPRIM) tablet 300 mg  300 mg Oral DAILY Luisana Darnell MD   300 mg at 19 0920    aspirin delayed-release tablet 81 mg  81 mg Oral DAILY Luisana Darnell MD   81 mg at 19 09    venlafaxine-SR (EFFEXOR-XR) capsule 37.5 mg  37.5 mg Oral DAILY Luisana Darnell MD   37.5 mg at 19 09    [Held by provider] lisinopril (PRINIVIL, ZESTRIL) tablet 10 mg  10 mg Oral DAILY Luiasna Darnell MD   10 mg at 19 0831    oxyCODONE IR (ROXICODONE) tablet 5 mg  5 mg Oral Q6H PRN Luisana Darnell MD   5 mg at 19 9139       Review of Systems unable to obtain  PHYSICAL EXAM     Visit Vitals  /64 (BP 1 Location: Right arm, BP Patient Position: At rest)   Pulse 82   Temp 99 °F (37.2 °C)   Resp 19   SpO2 95%   Breastfeeding?  No      Temp (24hrs), Av.7 °F (37.1 °C), Min:98 °F (36.7 °C), Max:100 °F (37.8 °C)    Oxygen Therapy  O2 Sat (%): 95 % (07/05/19 1137)  Pulse via Oximetry: 70 beats per minute (07/03/19 1417)  O2 Device: Nasal cannula (07/03/19 1608)  O2 Flow Rate (L/min): 3 l/min (07/03/19 1608)    Intake/Output Summary (Last 24 hours) at 7/5/2019 1536  Last data filed at 7/5/2019 0035  Gross per 24 hour   Intake 1865 ml   Output    Net 1865 ml      General: No acute distress    Lungs: CTA bilaterally. Resp even and non labored  Heart:  S1S2 present without murmurs rubs gallops. RRR. No LE edema  Abdomen: Soft, non distended. BS present  Extremities: Limited ROM LLE. 2+ pedal pulses bilaterally. Neurologic:  Alert, confused. Calm at the present. Follows commands.      Results summary of Diagnostic Studies/Procedures copied from within [facility] EMR:        [address] Problems    Diagnosis Date Noted    Closed left hip fracture ([facility] 07/02/2019    Recurrent UTI 04/25/2016    Depression     Incontinence 04/10/2014    Neurogenic bladder 02/20/2014    Osteoarthritis of left knee 03/29/2013    HTN (hypertension) 09/10/2010    Hypothyroidism 09/10/2010     Plan:    Left hip fx  S/p repair 7/3  Continue PT/OT  Ortho following    Recurrent UTI  On chronic suppression  Did receive ancef pre-op  UA without signs of infection    HTN  Had hypotension last night, lisinopril held    Hallucinations, confusion, shuffling gait, tremor  Will obtain MRI brain while patient is inpatient  Will need Neurology follow up on DC r/o Parkinson's and Lewy Body Dementia  Check B12, folate, TSH    Fever/hypotension/leukocytosis  UA, CXR normal  Received PRBC for hypotension  Leukocytosis improving  CBC in AM    Acute blood loss anemia post op  Transfused PRBC last night  CBC in AM        Notes, labs, VS, diagnostic testing reviewed  Time spent with pt 20 min      DVT Prophylaxis: lovenox  Plan of Care Discussed with: Supervising MD Dr. Angie Sun, care team, pt daughter at bedside in length      Anil MONTENEGRO Rafael Andrade, NP

## 2019-07-05 NOTE — PROGRESS NOTES
Interdisciplinary team rounds were held 7/5/2019 with the following team members:Care Management and . Anticipate discharge to [facility] tomorrow. Plan of care discussed. See clinical pathway and/or care plan for interventions and desired outcomes.

## 2019-07-05 NOTE — PROGRESS NOTES
The patient was reported hypotensive and pale. She is on continuous LR at 125 ml/hr. She received 500 ml NS bolus at mid-day for hypotension. She had a fever of 100.5F. She has leukocytosis. Possible reactive after surgery. CXR and UA reviewed, and were normal   Repeated Hb with drop to 8.4 gr from 10gr. Plan:   give 250 ml NS bolus x 1. Transfuse 1 PRBC  Monitor HH q 8hrs   Monitor fever curve, if she persists febrile will start iv antibiotics.

## 2019-07-06 VITALS
TEMPERATURE: 98.2 F | HEART RATE: 97 BPM | OXYGEN SATURATION: 98 % | SYSTOLIC BLOOD PRESSURE: 113 MMHG | DIASTOLIC BLOOD PRESSURE: 70 MMHG | RESPIRATION RATE: 20 BRPM

## 2019-07-06 LAB
ANION GAP SERPL CALC-SCNC: 8 MMOL/L (ref 7–16)
BASOPHILS # BLD: 0.1 K/UL (ref 0–0.2)
BASOPHILS NFR BLD: 1 % (ref 0–2)
BUN SERPL-MCNC: 27 MG/DL (ref 8–23)
CALCIUM SERPL-MCNC: 9 MG/DL (ref 8.3–10.4)
CHLORIDE SERPL-SCNC: 106 MMOL/L (ref 98–107)
CO2 SERPL-SCNC: 24 MMOL/L (ref 21–32)
CREAT SERPL-MCNC: 1.05 MG/DL (ref 0.6–1)
DIFFERENTIAL METHOD BLD: ABNORMAL
EOSINOPHIL # BLD: 0.4 K/UL (ref 0–0.8)
EOSINOPHIL NFR BLD: 3 % (ref 0.5–7.8)
ERYTHROCYTE [DISTWIDTH] IN BLOOD BY AUTOMATED COUNT: 13.9 % (ref 11.9–14.6)
FOLATE SERPL-MCNC: 5.6 NG/ML (ref 3.1–17.5)
GLUCOSE SERPL-MCNC: 98 MG/DL (ref 65–100)
HCT VFR BLD AUTO: 23.1 % (ref 35.8–46.3)
HGB BLD-MCNC: 7.7 G/DL (ref 11.7–15.4)
IMM GRANULOCYTES # BLD AUTO: 0.1 K/UL (ref 0–0.5)
IMM GRANULOCYTES NFR BLD AUTO: 1 % (ref 0–5)
LYMPHOCYTES # BLD: 1.2 K/UL (ref 0.5–4.6)
LYMPHOCYTES NFR BLD: 11 % (ref 13–44)
MCH RBC QN AUTO: 32.2 PG (ref 26.1–32.9)
MCHC RBC AUTO-ENTMCNC: 33.3 G/DL (ref 31.4–35)
MCV RBC AUTO: 96.7 FL (ref 79.6–97.8)
MONOCYTES # BLD: 0.7 K/UL (ref 0.1–1.3)
MONOCYTES NFR BLD: 6 % (ref 4–12)
NEUTS SEG # BLD: 8.5 K/UL (ref 1.7–8.2)
NEUTS SEG NFR BLD: 79 % (ref 43–78)
NRBC # BLD: 0 K/UL (ref 0–0.2)
PLATELET # BLD AUTO: 130 K/UL (ref 150–450)
PMV BLD AUTO: 12.9 FL (ref 9.4–12.3)
POTASSIUM SERPL-SCNC: 4.2 MMOL/L (ref 3.5–5.1)
RBC # BLD AUTO: 2.39 M/UL (ref 4.05–5.2)
SODIUM SERPL-SCNC: 138 MMOL/L (ref 136–145)
TSH SERPL DL<=0.005 MIU/L-ACNC: 1.17 UIU/ML (ref 0.36–3.74)
VIT B12 SERPL-MCNC: 387 PG/ML (ref 193–986)
WBC # BLD AUTO: 10.8 K/UL (ref 4.3–11.1)

## 2019-07-06 PROCEDURE — 74011250637 HC RX REV CODE- 250/637: Performed by: NURSE PRACTITIONER

## 2019-07-06 PROCEDURE — 97530 THERAPEUTIC ACTIVITIES: CPT

## 2019-07-06 PROCEDURE — 74011250637 HC RX REV CODE- 250/637: Performed by: ORTHOPAEDIC SURGERY

## 2019-07-06 PROCEDURE — 80048 BASIC METABOLIC PNL TOTAL CA: CPT

## 2019-07-06 PROCEDURE — 74011250636 HC RX REV CODE- 250/636: Performed by: ORTHOPAEDIC SURGERY

## 2019-07-06 PROCEDURE — 82746 ASSAY OF FOLIC ACID SERUM: CPT

## 2019-07-06 PROCEDURE — 74011250637 HC RX REV CODE- 250/637: Performed by: INTERNAL MEDICINE

## 2019-07-06 PROCEDURE — 82607 VITAMIN B-12: CPT

## 2019-07-06 PROCEDURE — 36415 COLL VENOUS BLD VENIPUNCTURE: CPT

## 2019-07-06 PROCEDURE — 84443 ASSAY THYROID STIM HORMONE: CPT

## 2019-07-06 PROCEDURE — 85025 COMPLETE CBC W/AUTO DIFF WBC: CPT

## 2019-07-06 RX ORDER — ENOXAPARIN SODIUM 100 MG/ML
40 INJECTION SUBCUTANEOUS DAILY
Qty: 28 SYRINGE | Refills: 0 | Status: SHIPPED | OUTPATIENT
Start: 2019-07-07 | End: 2019-08-04

## 2019-07-06 RX ORDER — TRAMADOL HYDROCHLORIDE AND ACETAMINOPHEN 37.5; 325 MG/1; MG/1
1 TABLET ORAL
Qty: 18 TAB | Refills: 0 | Status: SHIPPED | OUTPATIENT
Start: 2019-07-06 | End: 2019-07-09

## 2019-07-06 RX ADMIN — CALCIUM CARBONATE 500 MG (1,250 MG)-VITAMIN D3 200 UNIT TABLET 1 TABLET: at 09:30

## 2019-07-06 RX ADMIN — ALLOPURINOL 300 MG: 100 TABLET ORAL at 09:31

## 2019-07-06 RX ADMIN — LACTOBACILLUS TAB 2 TABLET: TAB at 09:38

## 2019-07-06 RX ADMIN — LEVOTHYROXINE SODIUM 75 MCG: 75 TABLET ORAL at 05:15

## 2019-07-06 RX ADMIN — CALCIUM CARBONATE 500 MG (1,250 MG)-VITAMIN D3 200 UNIT TABLET 1 TABLET: at 11:52

## 2019-07-06 RX ADMIN — MAGNESIUM GLUCONATE 500 MG ORAL TABLET 400 MG: 500 TABLET ORAL at 09:32

## 2019-07-06 RX ADMIN — ACETAMINOPHEN 650 MG: 325 TABLET, FILM COATED ORAL at 05:15

## 2019-07-06 RX ADMIN — TRIMETHOPRIM 100 MG: 100 TABLET ORAL at 09:32

## 2019-07-06 RX ADMIN — ASPIRIN 81 MG: 81 TABLET ORAL at 09:32

## 2019-07-06 RX ADMIN — ENOXAPARIN SODIUM 40 MG: 40 INJECTION SUBCUTANEOUS at 09:33

## 2019-07-06 RX ADMIN — VENLAFAXINE HYDROCHLORIDE 37.5 MG: 37.5 CAPSULE, EXTENDED RELEASE ORAL at 09:31

## 2019-07-06 RX ADMIN — Medication 10 ML: at 05:15

## 2019-07-06 NOTE — PROGRESS NOTES
Problem: Mobility Impaired (Adult and Pediatric)  Goal: *Acute Goals and Plan of Care (Insert Text)  Description  LTG:  (1.)Ms. Khai Beltran will move from supine to sit and sit to supine , scoot up and down and roll side to side with MINIMAL ASSIST within 7 treatment day(s). (2.)Ms. Khai Beltran will transfer from bed to chair and chair to bed with MINIMAL ASSIST using the least restrictive device within 7 treatment day(s). (3.)Ms. Khai Beltran will ambulate with MODERATE ASSIST for 10 feet with the least restrictive device within 7 treatment day(s). (4.)Ms. Khai Beltran will perform exercises per HEP for 10+ minutes to improve strength and mobility within 7 days. ________________________________________________________________________________________________   Outcome: Progressing Towards Goal     PHYSICAL THERAPY: Daily Note and AM 7/6/2019  INPATIENT: PT Visit Days : 4  [place] / [place] / Product Type: Medicare /       NAME/AGE/GENDER: Amado Park is a 69 y.o. female   PRIMARY DIAGNOSIS: Closed left hip fracture (HCC) [S72.002A] Closed left hip fracture (HCC)   Closed left hip fracture (HCC)    Procedure(s) (LRB):  LEFT HIP HEMIARTHROPLASTY (Left)  3 Days Post-Op  ICD-10: Treatment Diagnosis:    · Generalized Muscle Weakness (M62.81)  · Difficulty in walking, Not elsewhere classified (R26.2)  · Other abnormalities of gait and mobility (R26.89)  · History of falling (Z91.81)   Precaution/Allergies:  Adhesive tape-silicones; Clarithromycin; Lortab [hydrocodone-acetaminophen]; Other medication; Peanut; Prednisone; and Prevacid [lansoprazole]      ASSESSMENT:     Ms. Khai Beltran is a 69year old female admitted from home with left hip fracture after fall at home; she is s/p left hip hemiarthroplasty and is WBAT per ortho orders. At baseline pt lives at home alone and is ambulatory with cane or walker around home.  Does not drive- family lives locally and can assist.   Patient was supine upon contact and agreeable to PT with encouragement. Patient is drowsy and lethargic needing max cues to stay on task and participate. Patient is reluctant to actively move any body part, joint, or extremity including moving fingers, toes, lifting head, etc. Patient requires total assist x 2 for all mobility including rolling and supine <-> sit. Once seated EOB patient requries constant support due to poor sitting balance and difficulty improving sitting balance. Patient sits EOB x 15 minutes with no improvement in balance. Deemed transfer training unsafe at this time due to aforementioned. Patient returns to supine with total assist x 2 where she rolls left and right with total assist x 2 to adjust linens under her in preparation for sheet transfer to recliner chair. Patient was sheet transferred to recliner chair with assist x 5. Overall slow progress towards physical therapy goals. Patient's goals listed above are still appropriate. Will continue skilled PT to address remaining deficits. This section established at most recent assessment   PROBLEM LIST (Impairments causing functional limitations):  1. Decreased Strength  2. Decreased ADL/Functional Activities  3. Decreased Transfer Abilities  4. Decreased Ambulation Ability/Technique  5. Decreased Balance  6. Increased Pain  7. Decreased Activity Tolerance  8. Decreased Knowledge of Precautions  9. Decreased Skin Integrity/Hygeine  10. Decreased Cognition   INTERVENTIONS PLANNED: (Benefits and precautions of physical therapy have been discussed with the patient.)  1. Balance Exercise  2. Bed Mobility  3. Gait Training  4. Home Exercise Program (HEP)  5. Therapeutic Activites  6. Therapeutic Exercise/Strengthening  7. Transfer Training     TREATMENT PLAN: Frequency/Duration: twice daily for duration of hospital stay  Rehabilitation Potential For Stated Goals: [facility] (at time of discharge pending progress):    Placement:   It is my opinion, based on this patient's performance to date, that Ms. Jose Cevallos may benefit [facility] due to the functional deficits listed above that are likely to improve with skilled rehabilitation and concerns that he/she may be unsafe to be unsupervised at home due to fall, weakness, limited mobility, post op pain, need for significant assist with mobility . Equipment:    Tbd pending progress               HISTORY:   History of Present Injury/Illness (Reason for Referral):  Per H&P, \"Mrs. Jose Cevallos is a nice 70 y/o WF with a h/o frequent UTIs on daily antibiotic suppression (TMP and doxycycline), hypothyroidism, CVA, HTN, MDD presenting s/p fall yesterday at home. She was in the kitchen cooking and was trying to \"move too quickly\" when she lost her balance and fell onto her left side. She noticed pain of the left hip but managed somewhat ok throughout yesterday, but pain persisted so she came to the ED today. Imaging shows left subcapital hip fracture. She denies chest pain, SOB, palpitations, dizziness or syncope at any point during her fall yesterday. Family is present and note that she has had frequent falls over the past several months, but has not had a fall in a few weeks. They thought it was neuropathy. She also recently saw Urology for another UTI, culture grew klebsiella pna sensitive to TMP (but resistant to doxy) which is what she was prescribed (in addition to daily doxycycline).  Hospitalist consulted for admission\"    Past Medical History/Comorbidities:   Ms. Jose Cevallos  has a past medical history of Acute on Chronic UTI (9/10/2010), Allergic rhinitis, AMD (age-related macular degeneration), bilateral, ARF (acute renal failure) ([address] (12/13/2010), Arrhythmia, Ataxia, C. difficile colitis (12/17/2010), Calculus of kidney (2/20/2014), Cancer ([facility] Chest pain, Cystocele, midline (2/20/2014), Depression, Falls frequently, GERD (gastroesophageal reflux disease), Gross hematuria (2/20/2014), Headache, Hematuria, microscopic, Hypertension, Hypomagnesemia (12/13/2010), Hypothyroidism, Incomplete bladder emptying (2/20/2014), Incontinence (4/10/2014), Kidney stones, Menopausal syndrome, Microscopic hematuria (2/20/2014), Mixed incontinence urge and stress (male)(female) (2/20/2014), Neurogenic bladder, NOS (2/20/2014), Neuropathy, Obese, Osteoarthritis of left knee (3/29/2013), Other chronic cystitis (2/20/2014), Other musculoskeletal symptoms referable to limbs(729.89) (2/20/2014), Other nonspecific finding on examination of urine (2/20/2014), Pneumonia RLL infiltrate (12/17/2010), Polyneuropathy, Postmenopausal atrophic vaginitis (2/20/2014), Sepsis (9/10/2010), Splitting of urinary stream (2/20/2014), [facility] Total knee replacement status (3/29/2013), Unspecified disorder of bladder (2/20/2014), Unspecified pyelonephritis (9/10/2010), Unspecified urinary incontinence (2/20/2014), Urge incontinence (2/20/2014), and UTI (urinary tract infection) Enterobacter (12/17/2010). Ms. Eleln Hess  has a past surgical history that includes hx heent; hx other surgical; hx heart catheterization (> 5 years ago); hx hysterectomy; hx knee arthroscopy; hx mohs procedure; hx orthopaedic; hx urological; hx urological; hx urological; hx other surgical; hx other surgical; hx other surgical; hx back surgery; hx colonoscopy; and hx other surgical.  Social History/Living Environment:   Home Environment: Private residence  # Steps to Enter: 3  Rails to Enter: Yes  Hand Rails : Bilateral  Wheelchair Ramp: No  One/Two Story Residence: One story  Living Alone: Yes  Support Systems: Child(cyndi), Family member(s)  Patient Expects to be Discharged to[de-identified] Rehabilitation facility  Current DME Used/Available at Home: Walker, rolling, Cane, straight, Shower chair, Commode, bedside  Prior Level of Function/Work/Activity:  Lives alone in single story home with 3 steps.  Mod I in home with cane or walker. Family assists with transportation. Number of Personal Factors/Comorbidities that affect the Plan of Care: 1-2: MODERATE COMPLEXITY   EXAMINATION:   Most Recent Physical Functioning:   Gross Assessment:                  Posture:     Balance:  Sitting: Impaired  Sitting - Static: Poor (constant support)  Sitting - Dynamic: Poor (constant support) Bed Mobility:  Rolling: Total assistance;Assist x2  Supine to Sit: Total assistance;Assist x2  Sit to Supine: Total assistance;Assist x2  Wheelchair Mobility:     Transfers:     Gait:            Body Structures Involved:  1. Bones  2. Joints  3. Muscles Body Functions Affected:  1. Mental  2. Sensory/Pain  3. Movement Related  4. Skin Related Activities and Participation Affected:  1. General Tasks and Demands  2. Mobility  3. Domestic Life  4. Community, Social and [place]   Number of elements that affect the Plan of Care: 4+: HIGH COMPLEXITY   CLINICAL PRESENTATION:   Presentation: Stable and uncomplicated: LOW COMPLEXITY   CLINICAL DECISION MAKIN [facility] Mobility Inpatient Short Form  How much difficulty does the patient currently have. .. Unable A Lot A Little None   1. Turning over in bed (including adjusting bedclothes, sheets and blankets)? ? 1   ? 2   ? 3   ? 4   2. Sitting down on and standing up from a chair with arms ( e.g., wheelchair, bedside commode, etc.)   ? 1   ? 2   ? 3   ? 4   3. Moving from lying on back to sitting on the side of the bed?   ? 1   ? 2   ? 3   ? 4   How much help from another person does the patient currently need. .. Total A Lot A Little None   4. Moving to and from a bed to a chair (including a wheelchair)? ? 1   ? 2   ? 3   ? 4   5. Need to walk in hospital room? ? 1   ? 2   ? 3   ? 4   6. Climbing 3-5 steps with a railing? ? 1   ? 2   ? 3   ? 4   © 2007, [facility] of [address] [place] [address] [address] [address], under license to [employer].  All rights reserved      Score:  Initial: 9 Most Recent: X (Date: -- )    Interpretation of Tool:  Represents activities that are increasingly more difficult (i.e. Bed mobility, Transfers, Gait). Medical Necessity:     · Patient demonstrates good  ·  rehab potential due to higher previous functional level. Reason for Services/Other Comments:  · Patient continues to demonstrate capacity to improve strength, mobility, balance, transfers, activity tolerance which will increase independence, decrease amount of assistance required from caregiver and increase safety  · . Use of outcome tool(s) and clinical judgement create a POC that gives a: Clear prediction of patient's progress: LOW COMPLEXITY            TREATMENT:   (In addition to Assessment/Re-Assessment sessions the following treatments were rendered)   Pre-treatment Symptoms/Complaints:  None  Pain: Initial:   Pain Intensity 1: 0  Post Session:  0/10     Therapeutic Activity: (   25 minutes ):  Therapeutic activities including bed mobility training including rolling left and right and supine <-> sit, static sitting balance training, scooting, posture training, trunk control, any active movement of any joint or extremity, sheet transfer, repositioning in bed, and patient education improve mobility, strength, balance, coordination and activity tolerance . Required maximal verbal, tactile and manual cues   to promote static and dynamic balance in standing and promote motor control of bilateral, lower extremity(s). Braces/Orthotics/Lines/Etc:   · O2 Device: Nasal cannula  Treatment/Session Assessment:    · Response to Treatment: see above  Interdisciplinary Collaboration:   o Physical Therapy Assistant  o Registered Nurse  o Rehabilitation Attendant  · After treatment position/precautions:   o Up in chair  o Bed alarm/tab alert on  o Bed/Chair-wheels locked  o Bed in low position  o Call light within reach  o RN notified  o Family at bedside   · Compliance with Program/Exercises:  Will assess as treatment progresses  · Recommendations/Intent for next treatment session: \"Next visit will focus on advancements to more challenging activities and reduction in assistance provided\".   Total Treatment Duration:  PT Patient Time In/Time Out  Time In: 1051  Time Out: [address], PTA

## 2019-07-06 NOTE — PROGRESS NOTES
Pt is for discharge to [facility] for rehab via [facility] transport. Packet prepared to go with pt to SNF. Care Management Interventions  PCP Verified by CM:  Yes  Mode of Transport at Discharge: BLS  Transition of Care Consult (CM Consult): SNF  Partner SNF: Yes  Physical Therapy Consult: Yes  Occupational Therapy Consult: Yes  Speech Therapy Consult: No  Current Support Network: Own Home, Lives Alone  Confirm Follow Up Transport: Family  Plan discussed with Pt/Family/Caregiver: Yes  Freedom of Choice Offered: Yes  [address] Provided?: No  Discharge Location  Discharge Placement: Rehab Unit Subacute

## 2019-07-06 NOTE — DISCHARGE SUMMARY
Hospitalist Discharge Summary     Admit Date:  2019  2:16 PM   Name:  Collette Islam   Age:  69 y.o.  :  1940   MRN:  [id number]   PCP:  David Uribe MD  Treatment Team: Attending Provider: Reggie Arita MD; Consulting Provider: Silvia Saint, DO; Utilization Review: Isabel Lucas; Consulting Provider: Denzel Hoyt MD; Care Manager: Navneet Whitehead RN; Physical Therapy Assistant: Elisabeth Reis PTA    Problem List for this Hospitalization:  Hospital Problems as of 2019 Date Reviewed: 2019          Codes Class Noted - Resolved POA    * (Principal) Closed left hip fracture ([address] ICD-10-CM: Z80.649Z  ICD-9-CM: 820.8  2019 - Present Yes        Recurrent UTI ICD-10-CM: N39.0  ICD-9-CM: 599.0  2016 - Present Yes        Depression ICD-10-CM: F32.9  ICD-9-CM: 311  Unknown - Present Yes        Incontinence ICD-10-CM: R32  ICD-9-CM: 788.30  4/10/2014 - Present Yes        Neurogenic bladder ICD-10-CM: N31.9  ICD-9-CM: 596.54  2014 - Present Yes        Osteoarthritis of left knee ICD-10-CM: M17.12  ICD-9-CM: 715.96  3/29/2013 - Present Yes        HTN (hypertension) (Chronic) ICD-10-CM: I10  ICD-9-CM: 401.9  9/10/2010 - Present Yes        Hypothyroidism (Chronic) ICD-10-CM: E03.9  ICD-9-CM: 244.9  9/10/2010 - Present Yes                Admission HPI from 2019:    Ms. Nelson Banda is a 68 yo female with PMH of chronc UTIs, c.diff colitis, depression, HTN, hypothyroidism, neuropathy who presented after falling with c/o left hip pain. Found to have a left femoral neck fx. S/p repair 7/3/19. Extensive conversation with daughter today in regards to declining mentation. Daughter reports over the last 8-9 months pt has had vivid dreams, visual hallucinations, memory loss. Reports shuffling gait, some tremor on exertion of UE.   She was dx with psychosis and depression and started on zoloft without improvement and effexor without improvement by PCP.  Pt on chronic UTI suppression with trimpex. Pt also with increasing falls due to unsteady gait. Also with truncal weakness. Pt is calm today during my exam.  Had episode of hypotension, fever and pallor last night. Had leukocytosis on admission but improved. UA, CXR normal.  She was given IVF and 1 unit PRBC for drop in hgb. Hemoglobin 7.7 with no active bleeding. Family at bedside. Patient to D/C to [facility] today. Renal function baseline. Follow up instructions and discharge meds at bottom of this note. Plan was discussed with patient and family. All questions answered. Patient was stable at time of discharge. 10 systems reviewed and negative except as noted in HPI. Diagnostic Imaging/Tests:   Xr Chest Sngl V    Result Date: 7/2/2019  History: Fall yesterday. Left hip pain. LEFT HIP SERIES: There is a mildly angulated fracture at the junction of the left femoral head and neck. Osteoarthritis is present in both hips. Lower lumbar degenerative spondylosis is present. LEFT FEMUR AP AND LATERAL VIEWS: There is a fracture at the junction of the femoral head and neck. A left knee prosthesis is present. PORTABLE CHEST X-RAY: Comparison is made to previous study October 10, 2011. There is no consolidation, pleural effusions or visible pneumothorax. IMPRESSION: Subcapital left hip fracture. Xr Hip Lt W Or Wo Pelv 2-3 Vws    Result Date: 7/3/2019  Left hip radiographs 7/3/2019 CLINICAL HISTORY: Fracture repair. FINDINGS: 3 views of the left hip are submitted for evaluation. These show a left hemiarthroplasty. The femoral component appears well seated. There has been surgical resection of the left femoral head and neck. No acute osseous change is otherwise seen. No evidence for dislocation or periprosthetic fracture is seen. Gas and a lateral staple line are seen in the proximal thigh soft tissues consistent with recent surgery. Additional pelvic surgical clips are seen. IMPRESSION: 1. Post surgical changes of the left hip as described above. Xr Hip Lt W Or Wo Pelv 2-3 Vws    Result Date: 7/2/2019  History: Fall yesterday. Left hip pain. LEFT HIP SERIES: There is a mildly angulated fracture at the junction of the left femoral head and neck. Osteoarthritis is present in both hips. Lower lumbar degenerative spondylosis is present. LEFT FEMUR AP AND LATERAL VIEWS: There is a fracture at the junction of the femoral head and neck. A left knee prosthesis is present. PORTABLE CHEST X-RAY: Comparison is made to previous study October 10, 2011. There is no consolidation, pleural effusions or visible pneumothorax. IMPRESSION: Subcapital left hip fracture. Xr Femur Lt 2 V    Result Date: 7/2/2019  History: Fall yesterday. Left hip pain. LEFT HIP SERIES: There is a mildly angulated fracture at the junction of the left femoral head and neck. Osteoarthritis is present in both hips. Lower lumbar degenerative spondylosis is present. LEFT FEMUR AP AND LATERAL VIEWS: There is a fracture at the junction of the femoral head and neck. A left knee prosthesis is present. PORTABLE CHEST X-RAY: Comparison is made to previous study October 10, 2011. There is no consolidation, pleural effusions or visible pneumothorax. IMPRESSION: Subcapital left hip fracture. Echocardiogram results:  No results found for this visit on 07/02/19. All Micro Results     Procedure Component Value Units Date/Time    CULTURE, URINE [[id number]] Collected:  07/03/19 1008    Order Status:  Completed Specimen:  Urine from [place] Specimen Updated:  07/05/19 0649     Special Requests: NO SPECIAL REQUESTS        Culture result: NO GROWTH 2 DAYS       MSSA/MRSA SC BY PCR, NASAL SWAB [[id number]] Collected:  07/02/19 2223    Order Status:  Completed Specimen:  Nasal swab Updated:  07/03/19 0030     Special Requests: NO SPECIAL REQUESTS        Culture result:       SA target not detected. A MRSA NEGATIVE, SA NEGATIVE test result does not preclude MRSA or SA nasal colonization. MSSA/MRSA SC BY PCR, NASAL SWAB [[id number]] Collected:  07/02/19 2131    Order Status:  Canceled Specimen:  Nasal swab           Labs: Results:       BMP, Mg, Phos Recent Labs     07/06/19 0718 07/05/19 0725 07/04/19  0450    137 137   K 4.2 4.4 4.4    106 107   CO2 24 24 21   AGAP 8 7 9   BUN 27* 28* 19   CREA 1.05* 1.22* 1.15*   CA 9.0 8.9 8.9   GLU 98 107* 124*   MG  --   --  1.7*      CBC Recent Labs     07/06/19 0718 07/05/19 2143 07/05/19  1308 07/05/19 0725 07/04/19 0450   WBC 10.8  --   --  12.8*  --  17.9*   RBC 2.39*  --   --  2.58*  --  3.04*   HGB 7.7* 7.9* 8.3* 8.3*   < > 10.0*   HCT 23.1* 23.2* 25.1* 24.5*   < > 29.6*   *  --   --  111*  --  154   GRANS 79*  --   --  83*  --  87*   LYMPH 11*  --   --  9*  --  6*   EOS 3  --   --  1  --  0*   MONOS 6  --   --  6  --  6   BASOS 1  --   --  0  --  0   IG 1  --   --  1  --  1   ANEU 8.5*  --   --  10.7*  --  15.6*   ABL 1.2  --   --  1.2  --  1.1   DAIN 0.4  --   --  0.1  --  0.0   ABM 0.7  --   --  0.8  --  1.0   ABB 0.1  --   --  0.0  --  0.1   AIG 0.1  --   --  0.1  --  0.1    < > = values in this interval not displayed. LFT No results for input(s): SGOT, ALT, TBIL, AP, TP, ALB, GLOB, AGRAT, GPT in the last 72 hours.    Cardiac Testing No results found for: BNPP, BNP, CPK, RCK1, RCK2, RCK3, RCK4, CKMB, CKNDX, CKND1, TROPT, TROIQ   Coagulation Tests Lab Results   Component Value Date/Time    Prothrombin time 14.0 07/02/2019 03:24 PM    Prothrombin time 10.6 11/05/2014 05:40 PM    Prothrombin time 10.0 03/04/2013 10:14 AM    INR 1.1 07/02/2019 03:24 PM    INR 1.0 11/05/2014 05:40 PM    INR 1.0 03/04/2013 10:14 AM    aPTT 26.0 03/04/2013 10:14 AM    aPTT 26.6 10/29/2010 10:10 AM      A1c No results found for: HBA1C, HGBE8, UXH6NBZT   Lipid Panel Lab Results   Component Value Date/Time    Cholesterol, total 144 05/22/2019 09:13 AM    HDL Cholesterol 57 05/22/2019 09:13 AM    LDL, calculated 70 05/22/2019 09:13 AM    VLDL, calculated 17 05/22/2019 09:13 AM    Triglyceride 84 05/22/2019 09:13 AM      Thyroid Panel Lab Results   Component Value Date/Time    TSH 1.170 07/06/2019 07:18 AM    TSH 1.090 05/22/2019 09:13 AM        Most Recent UA Lab Results   Component Value Date/Time    Color YELLOW 07/03/2019 10:08 AM    Appearance HAZY 07/03/2019 10:08 AM    Specific gravity 1.018 11/05/2014 06:02 PM    pH (UA) 5.5 07/03/2019 10:08 AM    Protein NEGATIVE  07/03/2019 10:08 AM    Glucose NEGATIVE  07/03/2019 10:08 AM    Ketone NEGATIVE  07/03/2019 10:08 AM    Bilirubin NEGATIVE  07/03/2019 10:08 AM    Blood SMALL (A) 07/03/2019 10:08 AM    Urobilinogen 0.2 07/03/2019 10:08 AM    Nitrites NEGATIVE  07/03/2019 10:08 AM    Leukocyte Esterase NEGATIVE  07/03/2019 10:08 AM        Allergies   Allergen Reactions    Adhesive Tape-Silicones Rash    Clarithromycin Rash    Lortab [Hydrocodone-Acetaminophen] Nausea and Vomiting    Other Medication Nausea and Vomiting     Antibiotic for abd infection- Prev-pack given for H. Pylori    Peanut Itching    Prednisone Nausea and Vomiting     Medrol dose pack    Prevacid [Lansoprazole] Nausea and Vomiting     Immunization History   Administered Date(s) Administered    Influenza Vaccine 10/21/2014    Influenza Vaccine (Quad) PF 11/15/2018    Influenza Vaccine PF 10/15/2015    Pneumococcal Conjugate (PCV-13) 12/16/2016    Pneumococcal Vaccine (Unspecified Type) 03/16/2012    TB Skin Test (PPD) Intradermal 03/30/2013, 07/02/2019       All Labs from Last 24 Hrs:  Recent Results (from the past 24 hour(s))   HGB & HCT    Collection Time: 07/05/19  1:08 PM   Result Value Ref Range    HGB 8.3 (L) 11.7 - 15.4 g/dL    HCT 25.1 (L) 35.8 - 46.3 %   PLEASE READ & DOCUMENT PPD TEST IN 72 HRS    Collection Time: 07/05/19  6:30 PM   Result Value Ref Range    PPD Negative Negative    mm Induration 0 0 - 5 mm   HGB & HCT    Collection Time: 07/05/19  9:43 PM   Result Value Ref Range    HGB 7.9 (L) 11.7 - 15.4 g/dL    HCT 23.2 (L) 35.8 - 56.9 %   METABOLIC PANEL, BASIC    Collection Time: 07/06/19  7:18 AM   Result Value Ref Range    Sodium 138 136 - 145 mmol/L    Potassium 4.2 3.5 - 5.1 mmol/L    Chloride 106 98 - 107 mmol/L    CO2 24 21 - 32 mmol/L    Anion gap 8 7 - 16 mmol/L    Glucose 98 65 - 100 mg/dL    BUN 27 (H) 8 - 23 MG/DL    Creatinine 1.05 (H) 0.6 - 1.0 MG/DL    GFR est AA >60 >60 ml/min/1.73m2    GFR est non-AA 54 (L) >60 ml/min/1.73m2    Calcium 9.0 8.3 - 10.4 MG/DL   CBC WITH AUTOMATED DIFF    Collection Time: 07/06/19  7:18 AM   Result Value Ref Range    WBC 10.8 4.3 - 11.1 K/uL    RBC 2.39 (L) 4.05 - 5.2 M/uL    HGB 7.7 (L) 11.7 - 15.4 g/dL    HCT 23.1 (L) 35.8 - 46.3 %    MCV 96.7 79.6 - 97.8 FL    MCH 32.2 26.1 - 32.9 PG    MCHC 33.3 31.4 - 35.0 g/dL    RDW 13.9 11.9 - 14.6 %    PLATELET 447 (L) 312 - 450 K/uL    MPV 12.9 (H) 9.4 - 12.3 FL    ABSOLUTE NRBC 0.00 0.0 - 0.2 K/uL    DF AUTOMATED      NEUTROPHILS 79 (H) 43 - 78 %    LYMPHOCYTES 11 (L) 13 - 44 %    MONOCYTES 6 4.0 - 12.0 %    EOSINOPHILS 3 0.5 - 7.8 %    BASOPHILS 1 0.0 - 2.0 %    IMMATURE GRANULOCYTES 1 0.0 - 5.0 %    ABS. NEUTROPHILS 8.5 (H) 1.7 - 8.2 K/UL    ABS. LYMPHOCYTES 1.2 0.5 - 4.6 K/UL    ABS. MONOCYTES 0.7 0.1 - 1.3 K/UL    ABS. EOSINOPHILS 0.4 0.0 - 0.8 K/UL    ABS. BASOPHILS 0.1 0.0 - 0.2 K/UL    ABS. IMM.  GRANS. 0.1 0.0 - 0.5 K/UL   FOLATE    Collection Time: 07/06/19  7:18 AM   Result Value Ref Range    Folate 5.6 3.1 - 17.5 ng/mL   VITAMIN B12    Collection Time: 07/06/19  7:18 AM   Result Value Ref Range    Vitamin B12 387 193 - 986 pg/mL   TSH 3RD GENERATION    Collection Time: 07/06/19  7:18 AM   Result Value Ref Range    TSH 1.170 0.358 - 3.740 uIU/mL       Discharge Exam:  Patient Vitals for the past 24 hrs:   Temp Pulse Resp BP SpO2   07/06/19 0758 98.5 °F (36.9 °C) 84 16 93/57 98 %   07/06/19 0510 98.7 °F (37.1 °C) 90 18 104/61 96 %   07/06/19 0145 98.5 °F (36.9 °C) 96 18 115/60 96 %   07/05/19 2153 98.7 °F (37.1 °C) 94 18 120/55 95 %   07/05/19 1709 99.6 °F (37.6 °C) 88 19 104/64 93 %     Oxygen Therapy  O2 Sat (%): 98 % (07/06/19 0758)  Pulse via Oximetry: 70 beats per minute (07/03/19 1417)  O2 Device: Nasal cannula (07/03/19 1608)  O2 Flow Rate (L/min): 3 l/min (07/03/19 1608)  No intake or output data in the 24 hours ending 07/06/19 1200      Physical exam:  General:    Well nourished. Alert. No distress. Eyes:   Normal sclera. Extraocular movements intact. ENT:  Normocephalic, atraumatic. Moist mucous membranes  CV:   Regular rate and rhythm. No murmur, rub, or gallop. Lungs:  Clear to auscultation bilaterally. No wheezing, rhonchi, or rales. Abdomen: Soft, nontender, nondistended. Bowel sounds normal.   Extremities: Warm and dry. No cyanosis or edema. Neurologic: No focal deficits  Skin:     No rashes or jaundice. Psych:  Normal mood and affect. Discharge Info:   Current Discharge Medication List      START taking these medications    Details   enoxaparin (LOVENOX) 40 mg/0.4 mL 0.4 mL by SubCUTAneous route daily for 28 days. Qty: 28 Syringe, Refills: 0      traMADol-acetaminophen (ULTRACET) 37.5-325 mg per tablet Take 1 Tab by mouth every four (4) hours as needed for Pain for up to 3 days. Max Daily Amount: 6 Tabs. Qty: 18 Tab, Refills: 0    Associated Diagnoses: Closed fracture of left hip, initial encounter ([facility] [address]         CONTINUE these medications which have NOT CHANGED    Details   venlafaxine-SR (EFFEXOR-XR) 37.5 mg capsule Take 1 Cap by mouth daily. Qty: 30 Cap, Refills: 5      allopurinol (ZYLOPRIM) 300 mg tablet Take 1 Tab by mouth daily. Qty: 30 Tab, Refills: 5      lisinopril (PRINIVIL, ZESTRIL) 10 mg tablet TAKE ONE (1) TABLET BY MOUTH DAILY. Qty: 30 Tab, Refills: 5      levothyroxine (SYNTHROID) 75 mcg tablet Take 1 Tab by mouth Daily (before breakfast).   Qty: 30 Tab, Refills: 5 trimethoprim (TRIMPEX) 100 mg tablet Take 1 Tab by mouth two (2) times a day. Qty: 45 Tab, Refills: 5      oxybutynin chloride XL (DITROPAN XL) 10 mg CR tablet TAKE ONE (1) TABLET BY MOUTH DAILY. Qty: 30 Tab, Refills: 11    Associated Diagnoses: Urge incontinence      MYRBETRIQ 50 mg ER tablet Take 1 Tab by mouth daily. Indications: URINARY URGE INCONTINENCE  Qty: 30 Tab, Refills: 11         STOP taking these medications       doxycycline (MONODOX) 100 mg capsule Comments:   Reason for Stopping:         aspirin delayed-release 81 mg tablet Comments:   Reason for Stopping:         acetaminophen (TYLENOL) 650 mg CR tablet Comments:   Reason for Stopping:                 Disposition: SNF    Activity: Activity as tolerated  Diet: DIET REGULAR    Follow-up Appointments   Procedures    FOLLOW UP VISIT Appointment in: 3 - 5 Days [facility] on Monday CBC and BMP on Monday, July 8, 2019. Danisha Gilbert MD on Monday  CBC and BMP on Monday, July 8, 2019. Standing Status:   Standing     Number of Occurrences:   1     Order Specific Question:   Appointment in     Answer:   3 - 5 Days         Follow-up Information     Follow up With Specialties Details Why Contact Info    [address]  In 2 weeks 8 am [address] [facility]   [id number]  Ezequiel Guzman [address] [facility]    Bernarda Arvizu MD Family Practice   [address] [facility]  [address]  [phone]            Case reviewed with supervising physician - Dr. Chuy Gay    Time spent in patient discharge planning and coordination 35 minutes.     Signed:  CARLY Burrell

## 2019-07-06 NOTE — ROUTINE PROCESS
TRANSFER - OUT REPORT: 
 
Verbal report given to [facility] on [facility]  being transferred to  for routine progression of care Report consisted of patients Situation, Background, Assessment and  
Recommendations(SBAR). Information from the following report(s) SBAR was reviewed with the receiving nurse. Lines:    
 
Opportunity for questions and clarification was provided. Patient transported with: 
 O2 @ 3 liters

## 2019-07-08 ENCOUNTER — PATIENT OUTREACH (OUTPATIENT)
Dept: CASE MANAGEMENT | Age: 79
End: 2019-07-08

## 2019-07-08 NOTE — PROGRESS NOTES
This note will not be viewable in [address] RNCM notified of SAL to Tabitha Rhodes s/p hip fx. Will follow for SAL to home.

## 2019-07-09 ENCOUNTER — HOSPITAL ENCOUNTER (OUTPATIENT)
Dept: INFUSION THERAPY | Age: 79
Discharge: HOME | End: 2019-07-09
Payer: COMMERCIAL

## 2019-07-09 VITALS
HEART RATE: 87 BPM | TEMPERATURE: 98.5 F | RESPIRATION RATE: 16 BRPM | DIASTOLIC BLOOD PRESSURE: 77 MMHG | SYSTOLIC BLOOD PRESSURE: 143 MMHG | OXYGEN SATURATION: 94 %

## 2019-07-09 PROCEDURE — P9016 RBC LEUKOCYTES REDUCED: HCPCS

## 2019-07-09 PROCEDURE — 36430 TRANSFUSION BLD/BLD COMPNT: CPT

## 2019-07-09 PROCEDURE — 74011250636 HC RX REV CODE- 250/636

## 2019-07-09 PROCEDURE — 86900 BLOOD TYPING SEROLOGIC ABO: CPT

## 2019-07-09 PROCEDURE — 77030018667 ADMN ST IV BLD FENW -A

## 2019-07-09 PROCEDURE — 86923 COMPATIBILITY TEST ELECTRIC: CPT

## 2019-07-09 RX ORDER — SODIUM CHLORIDE 9 MG/ML
250 INJECTION, SOLUTION INTRAVENOUS AS NEEDED
Status: DISCONTINUED | OUTPATIENT
Start: 2019-07-09 | End: 2019-07-13 | Stop reason: HOSPADM

## 2019-07-09 RX ADMIN — SODIUM CHLORIDE 250 ML: 900 INJECTION, SOLUTION INTRAVENOUS at 12:30

## 2019-07-09 NOTE — PROGRESS NOTES
Arrived to the [facility]. 2 units prbcs completed. Patient tolerated well. Incontinent x2. Changed and repositioned prior to discharge. Any issues or concerns during appointment: no  Patient aware of next infusion appointment on: no infusions are scheduled.   Discharged via stretcher per [facility]

## 2019-07-10 ENCOUNTER — PATIENT OUTREACH (OUTPATIENT)
Dept: CASE MANAGEMENT | Age: 79
End: 2019-07-10

## 2019-07-10 LAB
ABO + RH BLD: NORMAL
BLD PROD TYP BPU: NORMAL
BLD PROD TYP BPU: NORMAL
BLOOD GROUP ANTIBODIES SERPL: NORMAL
BPU ID: NORMAL
BPU ID: NORMAL
CROSSMATCH RESULT,%XM: NORMAL
CROSSMATCH RESULT,%XM: NORMAL
SPECIMEN EXP DATE BLD: NORMAL
STATUS OF UNIT,%ST: NORMAL
STATUS OF UNIT,%ST: NORMAL
UNIT DIVISION, %UDIV: 0
UNIT DIVISION, %UDIV: 0

## 2019-07-10 NOTE — PROGRESS NOTES
Community Care Team documentation for patient in [facility]    The information below provided by:[facility]    PT Update:    Mod  A with bed mobility , Mod A of 2 with transfers, unable to ambulate at this time, UB adls Min A and LB Max A, Max A with toileting skills      Nursing Update:no issues noted at left hip surgical site; res had blood transfusion scheduled today r/t HGB of 7.3, has ortho f/u scheduled 7/17, continues on lovenox until 8/4, no dc date at this time      Discharge Date:TBD      Assign to [address] [place] [address]

## 2019-07-19 ENCOUNTER — PATIENT OUTREACH (OUTPATIENT)
Dept: CASE MANAGEMENT | Age: 79
End: 2019-07-19

## 2019-07-19 NOTE — PROGRESS NOTES
Community Care Team documentation for patient in [facility]    The information below provided by:[facility]    PT Update: Mod I for bed mobility and transfers, weight baring as tolerated, standing for up to 2 minutes with mod assist. UB dressing - min assist, LB dressing - max assist. Toileting - max assist.        Nursing Update:Staples removed on 7/17. Continutes on Lovenox until 8/4. No discharge date yet.       Discharge Date:TBD      Assign to [address] [place] [address]

## 2019-07-26 ENCOUNTER — PATIENT OUTREACH (OUTPATIENT)
Dept: CASE MANAGEMENT | Age: 79
End: 2019-07-26

## 2019-07-26 ENCOUNTER — HOSPITAL ENCOUNTER (OUTPATIENT)
Dept: LAB | Age: 79
Discharge: HOME | End: 2019-07-26

## 2019-07-26 LAB
ANION GAP SERPL CALC-SCNC: 12 MMOL/L (ref 7–16)
BUN SERPL-MCNC: 54 MG/DL (ref 8–23)
CALCIUM SERPL-MCNC: 9.4 MG/DL (ref 8.3–10.4)
CHLORIDE SERPL-SCNC: 101 MMOL/L (ref 98–107)
CO2 SERPL-SCNC: 24 MMOL/L (ref 21–32)
CREAT SERPL-MCNC: 2.39 MG/DL (ref 0.6–1)
GLUCOSE SERPL-MCNC: 76 MG/DL (ref 65–100)
POTASSIUM SERPL-SCNC: 5.2 MMOL/L (ref 3.5–5.1)
SODIUM SERPL-SCNC: 137 MMOL/L (ref 136–145)

## 2019-07-26 PROCEDURE — 80048 BASIC METABOLIC PNL TOTAL CA: CPT

## 2019-07-26 NOTE — PROGRESS NOTES
Community Care Team documentation for patient in [facility]    The information below provided by:    PT Update:         Nursing Update:      Discharge Date:      Assign to [facility] Care Manager:      [facility] Team documentation for patient in [facility]    The information below provided by:[facility]    PT Update: Functional transfers- are Max A with 2 people- not able to ambulate and is L LE TDWB, standing parallel bars for 1 minute, UB adls Mod A and LB Max A , Max A toileting. Nursing Update:no issues noted at surgical site to left hip; ortho appt 8/15; continues on Lovenox until 8/4. Receiving ABT for cellulitis to right ankle.        Discharge Date:7/26/19 W/ Private Caregivers 24/7      Assign to [address] [place] [address]

## 2019-07-29 ENCOUNTER — PATIENT OUTREACH (OUTPATIENT)
Dept: CASE MANAGEMENT | Age: 79
End: 2019-07-29

## 2019-07-30 NOTE — PROGRESS NOTES
Date/Time of Call:   7/29/19 @ 2:25pm    What was the patient hospitalized for? Closed L hip fracture s/p hospitalization and STR   Does the patient understand his/her diagnosis and/or treatment and what happened during the hospitalization? Son in law and daughter are primary caregivers. Did the patient receive discharge instructions? Yes   CM Assessed Risk for Readmission:       Patient stated Risk for Readmission:      Yes      HRRP    Review any discharge instructions (see discharge instructions/AVS in [facility]). Ask patient if they understand these. Do they have any questions? No concerns/questions about discharge   Were home services ordered (nursing, PT, OT, ST, etc.)? Yes with [facility]   If so, has the first visit occurred? If not, why? (Assist with coordination of services if necessary.)   Yes. Was any DME ordered? N/A   If so, has it been received? If not, why?  (Assist patient in obtaining DME orders &/or equipment if necessary.)     N/A     Complete a review of all medications (new, continued and discontinued meds per the D/C instructions and medication tab in [facility]). Medications reviewed. Were all new prescriptions filled? If not, why?  (Assist patient in obtaining medications if necessary  escalate for CCM &/or SW if ongoing issues are verbalized by pt or anticipated)   No new prescriptions     Does the patient understand the purpose and dosing instructions for all medications? (If patient has questions, provide explanation and education.)   Caregiver voices understanding   Does the patient have any problems in performing ADLs? (If patient is unable to perform ADLs  what is the limiting factor(s)? Do they have a support system that can assist? If no support system is present, discuss possible assistance that they may be able to obtain.  Escalate for CCM/SW if ongoing issues are verbalized by pt or anticipated)   Needs assist with ADLs        Son and daughter in law are support system           Does the patient have all follow-up appointments scheduled? 7 day f/up with PCP?   (f/up with PCP may be w/in 14 days if patient has a f/up with their specialist w/in 7 days)    7-14 day f/up with specialist?   (or per discharge instructions)    If f/up has not been made  what actions has the care coordinator made to accomplish this? Has transportation been arranged? Upcoming appointment with PCP on 8/6/19        Family will transport     Any other questions or concerns expressed by the patient? None at this time       When is care coordinators next follow-up call scheduled? If referred for CCM  what RN care manager was the referral assigned? Within 30 days      No indication for CCM services at this time               [facility] Call Completed By:   Steve Lee RN       This note will not be viewable in [address]

## 2019-08-02 ENCOUNTER — PATIENT OUTREACH (OUTPATIENT)
Dept: CASE MANAGEMENT | Age: 79
End: 2019-08-02

## 2019-08-29 ENCOUNTER — PATIENT OUTREACH (OUTPATIENT)
Dept: CASE MANAGEMENT | Age: 79
End: 2019-08-29

## 2019-08-29 NOTE — PROGRESS NOTES
Left a voice message with my contact information requesting a call back. This note will not be viewable in [address]

## 2019-08-31 ENCOUNTER — HOSPITAL ENCOUNTER (OUTPATIENT)
Dept: INFUSION THERAPY | Age: 79
Discharge: HOME | End: 2019-08-31
Payer: COMMERCIAL

## 2019-08-31 VITALS
TEMPERATURE: 98.4 F | SYSTOLIC BLOOD PRESSURE: 96 MMHG | DIASTOLIC BLOOD PRESSURE: 54 MMHG | HEART RATE: 67 BPM | RESPIRATION RATE: 16 BRPM | OXYGEN SATURATION: 98 %

## 2019-08-31 PROCEDURE — 77030018667 ADMN ST IV BLD FENW -A

## 2019-08-31 PROCEDURE — 36430 TRANSFUSION BLD/BLD COMPNT: CPT

## 2019-08-31 PROCEDURE — 74011250636 HC RX REV CODE- 250/636

## 2019-08-31 PROCEDURE — 74011250637 HC RX REV CODE- 250/637: Performed by: NURSE PRACTITIONER

## 2019-08-31 PROCEDURE — 86923 COMPATIBILITY TEST ELECTRIC: CPT

## 2019-08-31 PROCEDURE — P9016 RBC LEUKOCYTES REDUCED: HCPCS

## 2019-08-31 PROCEDURE — 86900 BLOOD TYPING SEROLOGIC ABO: CPT

## 2019-08-31 RX ORDER — SODIUM CHLORIDE 0.9 % (FLUSH) 0.9 %
5-10 SYRINGE (ML) INJECTION AS NEEDED
Status: DISCONTINUED | OUTPATIENT
Start: 2019-08-31 | End: 2019-09-04 | Stop reason: HOSPADM

## 2019-08-31 RX ORDER — SODIUM CHLORIDE 9 MG/ML
250 INJECTION, SOLUTION INTRAVENOUS ONCE
Status: COMPLETED | OUTPATIENT
Start: 2019-08-31 | End: 2019-08-31

## 2019-08-31 RX ORDER — ACETAMINOPHEN 325 MG/1
650 TABLET ORAL ONCE
Status: COMPLETED | OUTPATIENT
Start: 2019-08-31 | End: 2019-08-31

## 2019-08-31 RX ADMIN — Medication 10 ML: at 11:00

## 2019-08-31 RX ADMIN — SODIUM CHLORIDE 250 ML: 900 INJECTION, SOLUTION INTRAVENOUS at 11:05

## 2019-08-31 RX ADMIN — Medication 10 ML: at 14:45

## 2019-08-31 RX ADMIN — ACETAMINOPHEN 650 MG: 325 TABLET, FILM COATED ORAL at 10:49

## 2019-08-31 NOTE — PROGRESS NOTES
Arrived to the [facility]. 2 units PRBCs completed. Patient tolerated well. Any issues or concerns during appointment: none. Discharged via stretcher with [address] [place] [address] ambulance service.

## 2019-09-12 ENCOUNTER — PATIENT OUTREACH (OUTPATIENT)
Dept: CASE MANAGEMENT | Age: 79
End: 2019-09-12

## 2019-09-12 NOTE — PROGRESS NOTES
Unable to reach for follow up SAL. Episode to be closed. This note will not be viewable in [address]

## 2019-10-15 ENCOUNTER — HOSPITAL ENCOUNTER (OUTPATIENT)
Dept: GENERAL RADIOLOGY | Age: 79
Discharge: HOME | End: 2019-10-15

## 2019-10-15 DIAGNOSIS — M54.6 ACUTE MIDLINE THORACIC BACK PAIN: ICD-10-CM

## 2020-02-08 NOTE — PROGRESS NOTES
07/02/19 1724   Dual Skin Pressure Injury Assessment   Dual Skin Pressure Injury Assessment WDL   Generalized dry, flaky skin. Sacrum and heels intact, no redness. No other skin issues noted. No

## (undated) DEVICE — Z DISCONTINUED PER MEDLINE USE 2741944 DRESSING AQUACEL 12 IN SURG W9XL30CM SIL CVR WTRPRF VIR BACT BARR ANTIMIC

## (undated) DEVICE — DRAPE,HIP,W/POUCHES,STERILE: Brand: MEDLINE

## (undated) DEVICE — SOLUTION IRRIG 3000ML 0.9% SOD CHL FLX CONT 0797208] ICU MEDICAL INC]

## (undated) DEVICE — DRAPE TBL W72XH34IN D30IN SGL PC DISPOSABLE

## (undated) DEVICE — 2108 SERIES SAGITTAL BLADE, NO OFFSET  (24.8 X 1.27 X 82.1MM)

## (undated) DEVICE — SUTURE MCRYL SZ 3-0 L27IN ABSRB UD L19MM PS-2 3/8 CIR PRIM Y427H

## (undated) DEVICE — DRAPE,U/SHT,SPLIT,FILM,60X84,STERILE: Brand: MEDLINE

## (undated) DEVICE — SUTURE VCRL SZ 0 L36IN ABSRB UD L36MM CT-1 1/2 CIR J946H

## (undated) DEVICE — HOOD: Brand: FLYTE

## (undated) DEVICE — 3000CC GUARDIAN II: Brand: GUARDIAN

## (undated) DEVICE — (D)PREP SKN CHLRAPRP APPL 26ML -- CONVERT TO ITEM 371833

## (undated) DEVICE — 1010 S-DRAPE TOWEL DRAPE 10/BX: Brand: STERI-DRAPE™

## (undated) DEVICE — SOLUTION IV 1000ML 0.9% SOD CHL

## (undated) DEVICE — Device

## (undated) DEVICE — REM POLYHESIVE ADULT PATIENT RETURN ELECTRODE: Brand: VALLEYLAB

## (undated) DEVICE — FAN SPRAY KIT: Brand: PULSAVAC®

## (undated) DEVICE — 2000CC GUARDIAN II: Brand: GUARDIAN

## (undated) DEVICE — SUTURE PDS II SZ 1 L96IN ABSRB VLT TP-1 L65MM 1/2 CIR Z880G

## (undated) DEVICE — 3M™ IOBAN™ 2 ANTIMICROBIAL INCISE DRAPE 6651EZ: Brand: IOBAN™ 2

## (undated) DEVICE — SUTURE ETHBND EXCEL SZ 5 L30IN NONABSORBABLE GRN L40MM V-37 MB66G

## (undated) DEVICE — ADHESIVE SKIN CLOSURE 4X22 CM PREMIERPRO EXOFINFUSION DISP

## (undated) DEVICE — APPLICATOR BNDG 1MM ADH PREMIERPRO EXOFIN

## (undated) DEVICE — INTENDED FOR TISSUE SEPARATION, AND OTHER PROCEDURES THAT REQUIRE A SHARP SURGICAL BLADE TO PUNCTURE OR CUT.: Brand: BARD-PARKER ® STAINLESS STEEL BLADES